# Patient Record
Sex: FEMALE | Race: WHITE | NOT HISPANIC OR LATINO | Employment: UNEMPLOYED | URBAN - METROPOLITAN AREA
[De-identification: names, ages, dates, MRNs, and addresses within clinical notes are randomized per-mention and may not be internally consistent; named-entity substitution may affect disease eponyms.]

---

## 2017-02-17 ENCOUNTER — HOSPITAL ENCOUNTER (OUTPATIENT)
Facility: MEDICAL CENTER | Age: 57
End: 2017-02-17
Attending: NURSE PRACTITIONER
Payer: COMMERCIAL

## 2017-02-17 ENCOUNTER — OFFICE VISIT (OUTPATIENT)
Dept: MEDICAL GROUP | Facility: MEDICAL CENTER | Age: 57
End: 2017-02-17
Payer: COMMERCIAL

## 2017-02-17 VITALS
HEIGHT: 67 IN | BODY MASS INDEX: 30.89 KG/M2 | TEMPERATURE: 97.6 F | DIASTOLIC BLOOD PRESSURE: 72 MMHG | WEIGHT: 196.8 LBS | HEART RATE: 66 BPM | SYSTOLIC BLOOD PRESSURE: 112 MMHG | RESPIRATION RATE: 12 BRPM | OXYGEN SATURATION: 96 %

## 2017-02-17 DIAGNOSIS — Z12.4 SCREENING FOR MALIGNANT NEOPLASM OF CERVIX: ICD-10-CM

## 2017-02-17 DIAGNOSIS — G47.09 OTHER INSOMNIA: ICD-10-CM

## 2017-02-17 DIAGNOSIS — Z01.419 ENCOUNTER FOR GYNECOLOGICAL EXAMINATION: ICD-10-CM

## 2017-02-17 DIAGNOSIS — F32.89 OTHER DEPRESSION: ICD-10-CM

## 2017-02-17 DIAGNOSIS — E78.5 DYSLIPIDEMIA: ICD-10-CM

## 2017-02-17 DIAGNOSIS — B00.9 HSV INFECTION: ICD-10-CM

## 2017-02-17 LAB — CYTOLOGY REG CYTOL: NORMAL

## 2017-02-17 PROCEDURE — 88175 CYTOPATH C/V AUTO FLUID REDO: CPT

## 2017-02-17 PROCEDURE — 99396 PREV VISIT EST AGE 40-64: CPT | Performed by: NURSE PRACTITIONER

## 2017-02-17 RX ORDER — ZOLPIDEM TARTRATE 5 MG/1
5 TABLET ORAL NIGHTLY PRN
Qty: 15 TAB | Refills: 1 | Status: SHIPPED
Start: 2017-02-17 | End: 2017-03-02 | Stop reason: SDUPTHER

## 2017-02-17 RX ORDER — ATORVASTATIN CALCIUM 20 MG/1
40 TABLET, FILM COATED ORAL
Qty: 90 TAB | Refills: 3 | Status: SHIPPED | OUTPATIENT
Start: 2017-02-17 | End: 2017-12-04 | Stop reason: SDUPTHER

## 2017-02-17 RX ORDER — ACYCLOVIR 400 MG/1
400 TABLET ORAL
Qty: 90 TAB | Refills: 3 | Status: SHIPPED | OUTPATIENT
Start: 2017-02-17 | End: 2018-01-15 | Stop reason: SDUPTHER

## 2017-02-17 RX ORDER — DULOXETIN HYDROCHLORIDE 30 MG/1
30 CAPSULE, DELAYED RELEASE ORAL
Qty: 90 CAP | Refills: 3 | Status: SHIPPED | OUTPATIENT
Start: 2017-02-17 | End: 2017-06-08 | Stop reason: SDUPTHER

## 2017-02-17 NOTE — MR AVS SNAPSHOT
"        Celia Shaffer   2017 9:20 AM   Office Visit   MRN: [id number]    Department:  [address]   Dept Phone:  [phone]    Description:  Female : 1960   Provider:  HOMAR Ferreira           Reason for Visit     Annual Exam physical & pap     Medication Refill 3 month refills to [facility]       Allergies as of 2017     Allergen Noted Reactions    Nkda [No Known Drug Allergy] 2010         You were diagnosed with     Other insomnia   [705695]       Dyslipidemia   [054150]       Other depression   [7498665]       HSV infection   [552138]       Encounter for gynecological examination   [5528477]       Screening for malignant neoplasm of cervix   [606149]         Vital Signs     Blood Pressure Pulse Temperature Respirations Height Weight    112/72 mmHg 66 36.4 °C (97.6 °F) 12 1.702 m (5' 7\") 89.268 kg (196 lb 12.8 oz)    Body Mass Index Oxygen Saturation Smoking Status             30.82 kg/m2 96% Former Smoker         Basic Information     Date Of Birth Sex Race Ethnicity Preferred Language    1960 Female White Non- English      Problem List              ICD-10-CM Priority Class Noted - Resolved    Breast cancer (CMS-HCC) C50.919 Low  Unknown - Present    Depression F32.9 Low  Unknown - Present    PUD (peptic ulcer disease) K27.9 Low  Unknown - Present    Chronic neck pain M54.2, G89.29 Low  Unknown - Present    Hypothyroidism E03.9 Low  Unknown - Present    Coronary artery disease I25.10 High  2010 - Present    Dyslipidemia E78.5 Medium  2011 - Present    Obstructive sleep apnea syndrome G47.33 Medium  2011 - Present    Macrocytic anemia D53.9 Low  2012 - Present    HSV infection B00.9 Low  2014 - Present    History of myocardial infarction I25.2 Medium  2015 - Present    Abnormal TSH R79.89 Medium  3/10/2016 - Present    Low back pain with sciatica M54.40   2016 - Present      Health Maintenance        Date Due Completion " Dates    IMM DTaP/Tdap/Td Vaccine (1 - Tdap) 7/5/1980 ---    PAP SMEAR 8/15/2016 8/15/2013, 8/9/2012    IMM INFLUENZA (1) 9/1/2016 9/16/2011    COLONOSCOPY 9/26/2022 9/26/2012 (Done), 9/26/2009 (Done)    Override on 9/26/2012: Done ([facility])    Override on 9/26/2009: Done ([facility])            Current Immunizations     Influenza TIV (IM) 9/16/2011      Below and/or attached are the medications your provider expects you to take. Review all of your home medications and newly ordered medications with your provider and/or pharmacist. Follow medication instructions as directed by your provider and/or pharmacist. Please keep your medication list with you and share with your provider. Update the information when medications are discontinued, doses are changed, or new medications (including over-the-counter products) are added; and carry medication information at all times in the event of emergency situations     Allergies:  NKDA - (reactions not documented)               Medications  Valid as of: February 17, 2017 -  9:49 AM    Generic Name Brand Name Tablet Size Instructions for use    Acyclovir (Ointment) ZOVIRAX 5 % APPLY TO AFFECTED AREA(S) THREE TIMES DAILY.        Acyclovir (Tab) ZOVIRAX 400 MG Take 1 Tab by mouth every day.        Atorvastatin Calcium (Tab) LIPITOR 20 MG Take 2 Tabs by mouth every bedtime.        Cholecalciferol   Take  by mouth.        DiazePAM (Tab) VALIUM 2 MG Take 1 Tab by mouth every 8 hours as needed for Anxiety.        DULoxetine HCl (Cap DR Particles) CYMBALTA 30 MG Take 1 Cap by mouth every day. TAKE 1 CAP BY MOUTH EVERY DAY.        Multiple Vitamin (Tab) THERAGRAN  Take 1 Tab by mouth every day.        TraMADol HCl (Tab) ULTRAM 50 MG Take 1-2 Tabs by mouth every 6 hours as needed for Moderate Pain.        Zolpidem Tartrate (Tab) AMBIEN 5 MG Take 1 Tab by mouth at bedtime as needed for Sleep.        .                 Medicines prescribed today were sent to:     [facility]/PHARMACY #[id number] -  [place] - [address]    [address] [place] [address]    Phone: [phone] Fax: [phone]    Open 24 Hours?: No    [facility] - [place], [place] - [address] [facility]    [address] [facility] [facility] [address]    Phone: [phone] Fax: [phone]    Open 24 Hours?: No      Medication refill instructions:       If your prescription bottle indicates you have medication refills left, it is not necessary to call your provider’s office. Please contact your pharmacy and they will refill your medication.    If your prescription bottle indicates you do not have any refills left, you may request refills at any time through one of the following ways: The online [employer] system (except Urgent Care), by calling your provider’s office, or by asking your pharmacy to contact your provider’s office with a refill request. Medication refills are processed only during regular business hours and may not be available until the next business day. Your provider may request additional information or to have a follow-up visit with you prior to refilling your medication.   *Please Note: Medication refills are assigned a new Rx number when refilled electronically. Your pharmacy may indicate that no refills were authorized even though a new prescription for the same medication is available at the pharmacy. Please request the medicine by name with the pharmacy before contacting your provider for a refill.        Your To Do List     Future Labs/Procedures Complete By Expires    THINPREP PAP,REFLEX HPV ON ASC-US ONLY  As directed 2/18/2018         [employer] Access Code: Activation code not generated  Current [employer] Status: Active

## 2017-02-17 NOTE — PROGRESS NOTES
Chief Complaint   Patient presents with   • Annual Exam     physical & pap    • Medication Refill     3 month refills to [facility]        HPI:  Celia is a 56 y.o.  female who presents for annual exam. She has a history of breast cancer when she was 32 years old and had a double mastectomy. She's not having any breast or chest pain or discomforts. She has a history of a possible MI about 8 years ago and has been followed closely by cardiology. She had all normal stress testing done one year ago and has not been having any chest pain problems since. She prefers to take lower quantities of medication when possible and reduced her atorvastatin to 20 mg daily.  She is having trouble falling and staying asleep. She is under a lot of stress at home and she's had a death in her family. She is requesting help with sleep. She's tried antihistamines which she reports make her very groggy.  Regarding her health maintenance:   Last pap: 2013  Abnormal Pap hx: none. She mentions that she had an inflation in her early 30s because of heavy bleeding and has not had any vaginal bleeding since.  Periods: menopause  Last Mammo:not applicable due to hx of breast cancer age 32  Last colonoscopy:2012  Bone density test:2015  Last Lab: UTD  Last Td:current   Influenza vaccination:current - through health fair  Pneumococcal vaccination:not applicable   Zostavax:not applicable   Hx STD''s: no   Regular exercise: yes      meds:   Current Outpatient Prescriptions   Medication Sig Dispense Refill   • acyclovir (ZOVIRAX) 5 % Ointment APPLY TO AFFECTED AREA(S) THREE TIMES DAILY. 30 g 11   • tramadol (ULTRAM) 50 MG Tab Take 1-2 Tabs by mouth every 6 hours as needed for Moderate Pain. 30 Tab 1   • duloxetine (CYMBALTA) 30 MG Cap DR Particles TAKE 1 CAP BY MOUTH EVERY DAY. 30 Cap 11   • diazepam (VALIUM) 2 MG Tab Take 1 Tab by mouth every 8 hours as needed for Anxiety. 20 Tab 1   • atorvastatin (LIPITOR) 40 MG Tab Take 1 Tab by mouth every  "bedtime. 90 Tab 3   • Cholecalciferol (VITAMIN D PO) Take  by mouth.     • acyclovir (ZOVIRAX) 400 MG tablet TAKE 1 TABLET BY MOUTH EVERY DAY 60 Tab 0   • multivitamin (THERAGRAN) TABS Take 1 Tab by mouth every day.       No current facility-administered medications for this visit.       Allergies: No Known Allergies    family:   Family History   Problem Relation Age of Onset   • Hypertension Mother    • Arthritis Mother    • Stroke Mother    • Lung Disease Father      pulmonary fibrosis       social hx:   Social History     Social History   • Marital Status:      Spouse Name: N/A   • Number of Children: N/A   • Years of Education: N/A     Occupational History   • Not on file.     Social History Main Topics   • Smoking status: Former Smoker -- 0.50 packs/day for 10 years     Types: Cigarettes     Quit date: 01/01/1991   • Smokeless tobacco: Never Used   • Alcohol Use: Yes      Comment: 2 per day   • Drug Use: No   • Sexual Activity: Not on file      Comment: , 1 child IGT     Other Topics Concern   • Not on file     Social History Narrative       ROS:  No fever, chills, sweats.   No polydipsia, polyuria, temperature intolerance, significant weight changes   No visual changes, blurred vision.  No chest pain, palpitations, peripheral swelling   No chronic cough, shortness of breath, dyspnea with exertion.   No dysphagia, odynophagia, black or bloody stools.   No abdominal pain, nausea, persistent diarrhea, constipation   No dysuria, hematuria, incontinence. Denies nocturia.  Had botox in bladder last year with Dr. Daugherty - helped a lot  No rash, pruritis, pigment changes.   No focal weakness, syncope, headache, confusion, persistent numbness.   All other systems are reviewed and negative.    PHYSICAL EXAMINATION:  Blood pressure 112/72, pulse 66, temperature 36.4 °C (97.6 °F), resp. rate 12, height 1.702 m (5' 7\"), weight 89.268 kg (196 lb 12.8 oz), SpO2 96 %.  General appearance:healthy, well developed, " well nourished  Psych: alert, no distress, cooperative  Eyes: EOM's normal, pupils equal, round, reactive to light  ENT: Ears: external ears normal to inspection and palpation, TM's clear, Nose/Sinuses: nose shows no deformity, asymmetry, or inflammation  Neck: no asymmetry, masses, or scars, no adenopathy, thyroid normal to palpation  Lungs:chest symmetric with normal A/P diameter, no chest deformities noted, normal respiratory rate and rhythm  Cardiovascular:regular rate and rhythm, S1 normal  Breasts: Surgically absent with breast implants in place. No abnormal lumps, bumps or tenderness to her chest and axillary exam.  Abdomen: umbilicus normal, no masses palpable, no organomegaly  Musculoskeletal:ROM of all joints is normal, no evidence of joint instability  Lymphatic: None significantly enlarged  Skin: no rash, no edema  Neuro: mental status intact, cranial nerves 2-12 intact  Pelvic: external genitalia normal, cervix normal in appearance, bimanual exam reveals normal uterus, adnexa without masses or tenderness, vaginal mucosa normal      ASSESSMENT/PLAN:  1.annual physical exam: HCM:  Pap and breast exams done.  BSE technique reviewed and patient encouraged to perform self-exam monthly.   Encourage monthly self breast exam  Encourage daily exercise for at least 30 minutes  Recommend 1500 mg Calcium with 600 units vit d daily.    Follow up one year for annual PAP  Recommend labs yearly  Colonoscopy due in 2022  2.  Insomnia: Trial of zolpidem 2.5-5 mg 15-20 minutes prior to wanting to fall asleep. Discussed allowing at least 6 hours for sleep when taking Ambien. Discussed potential side effects of Ambien. She'll email me if this is not helpful for her. Discussed sleep hygiene.

## 2017-03-02 DIAGNOSIS — G47.09 OTHER INSOMNIA: ICD-10-CM

## 2017-03-02 RX ORDER — ZOLPIDEM TARTRATE 5 MG/1
5 TABLET ORAL NIGHTLY PRN
Qty: 30 TAB | Refills: 5 | Status: SHIPPED
Start: 2017-03-02 | End: 2017-03-03 | Stop reason: SDUPTHER

## 2017-03-03 DIAGNOSIS — G47.09 OTHER INSOMNIA: ICD-10-CM

## 2017-03-03 RX ORDER — ZOLPIDEM TARTRATE 5 MG/1
5 TABLET ORAL NIGHTLY PRN
Qty: 30 TAB | Refills: 5 | Status: SHIPPED | OUTPATIENT
Start: 2017-03-03 | End: 2017-06-14 | Stop reason: SDUPTHER

## 2017-03-03 NOTE — TELEPHONE ENCOUNTER
[facility] approved this at home, but needs to be faxed to mail order.  They will not let us call in to mail order.  Will you approve and then I will fax?  Thanks

## 2017-05-13 ENCOUNTER — OFFICE VISIT (OUTPATIENT)
Dept: URGENT CARE | Facility: CLINIC | Age: 57
End: 2017-05-13
Payer: COMMERCIAL

## 2017-05-13 VITALS
BODY MASS INDEX: 30.06 KG/M2 | DIASTOLIC BLOOD PRESSURE: 60 MMHG | SYSTOLIC BLOOD PRESSURE: 110 MMHG | RESPIRATION RATE: 16 BRPM | HEART RATE: 74 BPM | TEMPERATURE: 97.9 F | OXYGEN SATURATION: 96 % | WEIGHT: 192 LBS

## 2017-05-13 PROCEDURE — 99213 OFFICE O/P EST LOW 20 MIN: CPT | Performed by: PHYSICIAN ASSISTANT

## 2017-05-13 ASSESSMENT — ENCOUNTER SYMPTOMS
FEVER: 0
CONSTITUTIONAL NEGATIVE: 1
SWOLLEN GLANDS: 0
MUSCULOSKELETAL NEGATIVE: 1
NEUROLOGICAL NEGATIVE: 1
JOINT SWELLING: 0

## 2017-05-13 NOTE — MR AVS SNAPSHOT
Celia Shaffer   2017 1:45 PM   Office Visit   MRN: [id number]    Department:  [facility]   Dept Phone:  [phone]    Description:  Female : 1960   Provider:  Amilcar Schultz PA-C           Reason for Visit     Foreign Body in Skin x 2 wks, Splinter in Lt. heel, hard to walk      Allergies as of 2017     Allergen Noted Reactions    Nkda [No Known Drug Allergy] 2010         Vital Signs     Blood Pressure Pulse Temperature Respirations Weight Oxygen Saturation    110/60 mmHg 74 36.6 °C (97.9 °F) 16 87.091 kg (192 lb) 96%    Smoking Status                   Former Smoker           Basic Information     Date Of Birth Sex Race Ethnicity Preferred Language    1960 Female White Non- English      Problem List              ICD-10-CM Priority Class Noted - Resolved    Breast cancer (CMS-HCC) C50.919 Low  Unknown - Present    Depression F32.9 Low  Unknown - Present    PUD (peptic ulcer disease) K27.9 Low  Unknown - Present    Chronic neck pain M54.2, G89.29 Low  Unknown - Present    Hypothyroidism E03.9 Low  Unknown - Present    Coronary artery disease I25.10 High  2010 - Present    Dyslipidemia E78.5 Medium  2011 - Present    Obstructive sleep apnea syndrome G47.33 Medium  2011 - Present    Macrocytic anemia D53.9 Low  2012 - Present    HSV infection B00.9 Low  2014 - Present    History of myocardial infarction I25.2 Medium  2015 - Present    Abnormal TSH R79.89 Medium  3/10/2016 - Present    Low back pain with sciatica M54.40   2016 - Present      Health Maintenance        Date Due Completion Dates    IMM DTaP/Tdap/Td Vaccine (1 - Tdap) 1980 ---    PAP SMEAR 2020, 8/15/2013, 2012    COLONOSCOPY 2022 (Done), 2009 (Done)    Override on 2012: Done ([facility])    Override on 2009: Done ([facility])            Current Immunizations     Influenza TIV (IM) 2011      Below and/or  attached are the medications your provider expects you to take. Review all of your home medications and newly ordered medications with your provider and/or pharmacist. Follow medication instructions as directed by your provider and/or pharmacist. Please keep your medication list with you and share with your provider. Update the information when medications are discontinued, doses are changed, or new medications (including over-the-counter products) are added; and carry medication information at all times in the event of emergency situations     Allergies:  NKDA - (reactions not documented)               Medications  Valid as of: May 13, 2017 -  2:40 PM    Generic Name Brand Name Tablet Size Instructions for use    Acyclovir (Ointment) ZOVIRAX 5 % APPLY TO AFFECTED AREA(S) THREE TIMES DAILY.        Acyclovir (Tab) ZOVIRAX 400 MG Take 1 Tab by mouth every day.        Atorvastatin Calcium (Tab) LIPITOR 20 MG Take 2 Tabs by mouth every bedtime.        Cholecalciferol   Take  by mouth.        DiazePAM (Tab) VALIUM 2 MG Take 1 Tab by mouth every 8 hours as needed for Anxiety.        DULoxetine HCl (Cap DR Particles) CYMBALTA 30 MG Take 1 Cap by mouth every day. TAKE 1 CAP BY MOUTH EVERY DAY.        Multiple Vitamin (Tab) THERAGRAN  Take 1 Tab by mouth every day.        TraMADol HCl (Tab) ULTRAM 50 MG Take 1-2 Tabs by mouth every 6 hours as needed for Moderate Pain.        Zolpidem Tartrate (Tab) AMBIEN 5 MG Take 1 Tab by mouth at bedtime as needed for Sleep.        .                 Medicines prescribed today were sent to:     [facility]Y #[id number] - [place] - [address]    [address] [place] [address]    Phone: [phone] Fax: [phone]    Open 24 Hours?: No    [facility] - [place], [place] - [address]    [address] [facility] [address] [facility] [address]    Phone: [phone] Fax: [phone]    Open 24 Hours?: No      Medication refill instructions:       If your prescription bottle  indicates you have medication refills left, it is not necessary to call your provider’s office. Please contact your pharmacy and they will refill your medication.    If your prescription bottle indicates you do not have any refills left, you may request refills at any time through one of the following ways: The online [employer] system (except Urgent Care), by calling your provider’s office, or by asking your pharmacy to contact your provider’s office with a refill request. Medication refills are processed only during regular business hours and may not be available until the next business day. Your provider may request additional information or to have a follow-up visit with you prior to refilling your medication.   *Please Note: Medication refills are assigned a new Rx number when refilled electronically. Your pharmacy may indicate that no refills were authorized even though a new prescription for the same medication is available at the pharmacy. Please request the medicine by name with the pharmacy before contacting your provider for a refill.           [employer] Access Code: Activation code not generated  Current [employer] Status: Active

## 2017-05-13 NOTE — PROGRESS NOTES
Subjective:      Celia Shaffer is a 56 y.o. female who presents with Foreign Body in Skin            Foreign Body in Skin  This is a new problem. The current episode started 1 to 4 weeks ago (heel). The problem occurs constantly. The problem has been unchanged. Pertinent negatives include no fever, joint swelling or swollen glands. The symptoms are aggravated by walking. She has tried nothing for the symptoms. The treatment provided no relief.       Review of Systems   Constitutional: Negative.  Negative for fever.   Musculoskeletal: Negative.  Negative for joint swelling.   Skin: Negative.    Neurological: Negative.           Objective:     /60 mmHg  Pulse 74  Temp(Src) 36.6 °C (97.9 °F)  Resp 16  Wt 87.091 kg (192 lb)  SpO2 96%     Physical Exam   Constitutional: She is oriented to person, place, and time. She appears well-developed and well-nourished. No distress.   Musculoskeletal: Normal range of motion. She exhibits tenderness. She exhibits no edema.   sm heel fissure/lac; cleaned hibiclens, dermabond closure, steri strips   Neurological: She is alert and oriented to person, place, and time. No sensory deficit. She exhibits normal muscle tone. Gait abnormal. Coordination normal.   Skin: Skin is warm and dry. There is erythema.   Psychiatric: She has a normal mood and affect. Her behavior is normal. Judgment and thought content normal.   Nursing note and vitals reviewed.    Filed Vitals:    05/13/17 1406   BP: 110/60   Pulse: 74   Temp: 36.6 °C (97.9 °F)   Resp: 16   Weight: 87.091 kg (192 lb)   SpO2: 96%     Active Ambulatory Problems     Diagnosis Date Noted   • Breast cancer (CMS-HCC)    • Depression    • PUD (peptic ulcer disease)    • Chronic neck pain    • Hypothyroidism    • Coronary artery disease 08/02/2010   • Dyslipidemia 07/13/2011   • Obstructive sleep apnea syndrome 07/13/2011   • Macrocytic anemia 08/22/2012   • HSV infection 07/14/2014   • History of myocardial infarction  02/09/2015   • Abnormal TSH 03/10/2016   • Low back pain with sciatica 09/27/2016     Resolved Ambulatory Problems     Diagnosis Date Noted   • CHEST PAIN 11/09/2012   • Chest tightness 03/09/2016   • Jaw pain 03/09/2016   • Dyspnea on exertion 03/09/2016     Past Medical History   Diagnosis Date   • ASTHMA    • Cancer (CMS-HCC) 1992   • Sleep apnea    • Breath shortness    • Cold    • Anesthesia    • Pneumonia 2008   • CAD (coronary artery disease)    • Myocardial infarct (CMS-HCC)      Current Outpatient Prescriptions on File Prior to Visit   Medication Sig Dispense Refill   • zolpidem (AMBIEN) 5 MG Tab Take 1 Tab by mouth at bedtime as needed for Sleep. 30 Tab 5   • atorvastatin (LIPITOR) 20 MG Tab Take 2 Tabs by mouth every bedtime. 90 Tab 3   • duloxetine (CYMBALTA) 30 MG Cap DR Particles Take 1 Cap by mouth every day. TAKE 1 CAP BY MOUTH EVERY DAY. 90 Cap 3   • acyclovir (ZOVIRAX) 400 MG tablet Take 1 Tab by mouth every day. 90 Tab 3   • acyclovir (ZOVIRAX) 5 % Ointment APPLY TO AFFECTED AREA(S) THREE TIMES DAILY. 30 g 11   • tramadol (ULTRAM) 50 MG Tab Take 1-2 Tabs by mouth every 6 hours as needed for Moderate Pain. 30 Tab 1   • diazepam (VALIUM) 2 MG Tab Take 1 Tab by mouth every 8 hours as needed for Anxiety. 20 Tab 1   • Cholecalciferol (VITAMIN D PO) Take  by mouth.     • multivitamin (THERAGRAN) TABS Take 1 Tab by mouth every day.       No current facility-administered medications on file prior to visit.     Gargles, Cepacol lozenges, Aleve/Advil as needed for throat pain  Family History   Problem Relation Age of Onset   • Hypertension Mother    • Arthritis Mother    • Stroke Mother    • Lung Disease Father      pulmonary fibrosis     Nkda              Assessment/Plan:     ·  fissure/crack/lac in heel      · If glue/strips dont hold, f/u here [facility].to suture

## 2017-06-08 DIAGNOSIS — F32.89 OTHER DEPRESSION: ICD-10-CM

## 2017-06-08 RX ORDER — DULOXETIN HYDROCHLORIDE 30 MG/1
30 CAPSULE, DELAYED RELEASE ORAL
Qty: 30 CAP | Refills: 0 | Status: SHIPPED | OUTPATIENT
Start: 2017-06-08 | End: 2017-12-04

## 2017-06-08 NOTE — TELEPHONE ENCOUNTER
Was the patient seen in the last year in this department? Yes     Does patient have an active prescription for medications requested? No         Prescription Question        From     Celia Shaffer      To     Daniel Block Ma      Sent     6/8/2017 11:22 AM            Hi there,     Can you please have [facility] send an emergency override for two weeks worth of the generic Cymbalta to [facility]?   I have been in insurance [facility] since starting a new job.  I was covered under my 's MARY[employer] & [facility] through the [employer].  I now work for [employer].  I still have  and [facility] (same member number, etc.). But, [facility] had trouble sorting out the transfer of insurance.  I'm on 3 month mailorder for meds, but I won't get it in time.     Thanks!              Encounter Messages        Read Composed From To Subject       Y 6/8/2017 11:22 AM [facility] Guzman Prescription Question

## 2017-06-14 RX ORDER — ZOLPIDEM TARTRATE 5 MG/1
5 TABLET ORAL NIGHTLY PRN
Qty: 90 TAB | Refills: 0 | Status: SHIPPED
Start: 2017-06-14 | End: 2018-03-05 | Stop reason: SDUPTHER

## 2017-06-14 NOTE — TELEPHONE ENCOUNTER
Hi there,   Thank you for your help on my prescriptions, I recd'd them and am all set except for one prescription. My new insurance requires the Ambien to be a 90 day supply, they won't allow a 30 day.  Can you please ask Shantel to submit a 90 day supply through [employer]?     Last  refill 3/8/17

## 2017-08-16 DIAGNOSIS — F32.A DEPRESSION, UNSPECIFIED DEPRESSION TYPE: ICD-10-CM

## 2017-08-16 RX ORDER — DULOXETIN HYDROCHLORIDE 20 MG/1
20 CAPSULE, DELAYED RELEASE ORAL
Qty: 90 CAP | Refills: 3 | Status: SHIPPED | OUTPATIENT
Start: 2017-08-16 | End: 2018-03-06 | Stop reason: SDUPTHER

## 2017-08-16 RX ORDER — OMEPRAZOLE 40 MG/1
40 CAPSULE, DELAYED RELEASE ORAL DAILY
Qty: 90 CAP | Refills: 2 | Status: SHIPPED | OUTPATIENT
Start: 2017-08-16 | End: 2019-10-29

## 2017-08-16 RX ORDER — OMEPRAZOLE 40 MG/1
40 CAPSULE, DELAYED RELEASE ORAL DAILY
COMMUNITY
End: 2017-08-16 | Stop reason: SDUPTHER

## 2017-08-16 NOTE — TELEPHONE ENCOUNTER
----- Message from Your Healthcare Team sent at 8/16/2017  9:04 AM PDT -----  Regarding: Prescription Question  Contact: [phone]  Sree Funes!    I have a few medication things to run by you before I do my 90 day refills.  I would like to take the Cymbalta back down to 20mg. (I'm doing well &#[id number];)  Since my heart looks great, I would like to get off the statin entirely.  I would like a refill on the Omeprazole 40mg. (I don't believe that transferred over to my [facility] 90 day protocol.)    If I need to book an appt - please let me know.    Warm regards,  Celia

## 2017-10-24 ENCOUNTER — APPOINTMENT (OUTPATIENT)
Dept: SOCIAL WORK | Facility: CLINIC | Age: 57
End: 2017-10-24

## 2017-10-24 PROCEDURE — 90686 IIV4 VACC NO PRSV 0.5 ML IM: CPT | Performed by: REGISTERED NURSE

## 2017-11-14 ENCOUNTER — HOSPITAL ENCOUNTER (OUTPATIENT)
Facility: MEDICAL CENTER | Age: 57
End: 2017-11-14
Payer: COMMERCIAL

## 2017-11-14 LAB
25(OH)D3 SERPL-MCNC: 26 NG/ML (ref 30–100)
ALBUMIN SERPL BCP-MCNC: 4.3 G/DL (ref 3.2–4.9)
ALBUMIN/GLOB SERPL: 1.4 G/DL
ALP SERPL-CCNC: 77 U/L (ref 30–99)
ALT SERPL-CCNC: 35 U/L (ref 2–50)
ANION GAP SERPL CALC-SCNC: 8 MMOL/L (ref 0–11.9)
AST SERPL-CCNC: 27 U/L (ref 12–45)
BILIRUB SERPL-MCNC: 0.4 MG/DL (ref 0.1–1.5)
BUN SERPL-MCNC: 14 MG/DL (ref 8–22)
CALCIUM SERPL-MCNC: 9.4 MG/DL (ref 8.5–10.5)
CHLORIDE SERPL-SCNC: 107 MMOL/L (ref 96–112)
CHOLEST SERPL-MCNC: 211 MG/DL (ref 100–199)
CO2 SERPL-SCNC: 25 MMOL/L (ref 20–33)
CREAT SERPL-MCNC: 0.84 MG/DL (ref 0.5–1.4)
ERYTHROCYTE [DISTWIDTH] IN BLOOD BY AUTOMATED COUNT: 46.6 FL (ref 35.9–50)
GFR SERPL CREATININE-BSD FRML MDRD: >60 ML/MIN/1.73 M 2
GLOBULIN SER CALC-MCNC: 3 G/DL (ref 1.9–3.5)
GLUCOSE SERPL-MCNC: 88 MG/DL (ref 65–99)
HCT VFR BLD AUTO: 34.1 % (ref 37–47)
HDLC SERPL-MCNC: 56 MG/DL
HGB BLD-MCNC: 11.5 G/DL (ref 12–16)
LDLC SERPL CALC-MCNC: 137 MG/DL
MCH RBC QN AUTO: 33.8 PG (ref 27–33)
MCHC RBC AUTO-ENTMCNC: 33.7 G/DL (ref 33.6–35)
MCV RBC AUTO: 100.3 FL (ref 81.4–97.8)
PLATELET # BLD AUTO: 246 K/UL (ref 164–446)
PMV BLD AUTO: 9.8 FL (ref 9–12.9)
POTASSIUM SERPL-SCNC: 4.5 MMOL/L (ref 3.6–5.5)
PROT SERPL-MCNC: 7.3 G/DL (ref 6–8.2)
RBC # BLD AUTO: 3.4 M/UL (ref 4.2–5.4)
SODIUM SERPL-SCNC: 140 MMOL/L (ref 135–145)
T4 FREE SERPL-MCNC: 0.86 NG/DL (ref 0.53–1.43)
TRIGL SERPL-MCNC: 90 MG/DL (ref 0–149)
WBC # BLD AUTO: 3.7 K/UL (ref 4.8–10.8)

## 2017-12-04 ENCOUNTER — OFFICE VISIT (OUTPATIENT)
Dept: MEDICAL GROUP | Facility: LAB | Age: 57
End: 2017-12-04
Payer: COMMERCIAL

## 2017-12-04 VITALS
BODY MASS INDEX: 28.25 KG/M2 | WEIGHT: 180 LBS | TEMPERATURE: 98.7 F | DIASTOLIC BLOOD PRESSURE: 60 MMHG | SYSTOLIC BLOOD PRESSURE: 92 MMHG | HEART RATE: 71 BPM | RESPIRATION RATE: 12 BRPM | HEIGHT: 67 IN | OXYGEN SATURATION: 97 %

## 2017-12-04 DIAGNOSIS — F33.42 RECURRENT MAJOR DEPRESSIVE DISORDER, IN FULL REMISSION (HCC): ICD-10-CM

## 2017-12-04 DIAGNOSIS — Z23 NEED FOR TDAP VACCINATION: ICD-10-CM

## 2017-12-04 DIAGNOSIS — Z13.820 SCREENING FOR OSTEOPOROSIS: ICD-10-CM

## 2017-12-04 DIAGNOSIS — E78.5 DYSLIPIDEMIA: ICD-10-CM

## 2017-12-04 DIAGNOSIS — K27.9 PUD (PEPTIC ULCER DISEASE): ICD-10-CM

## 2017-12-04 DIAGNOSIS — Z78.0 MENOPAUSE: ICD-10-CM

## 2017-12-04 PROCEDURE — 90715 TDAP VACCINE 7 YRS/> IM: CPT | Performed by: NURSE PRACTITIONER

## 2017-12-04 PROCEDURE — 99214 OFFICE O/P EST MOD 30 MIN: CPT | Mod: 25 | Performed by: NURSE PRACTITIONER

## 2017-12-04 PROCEDURE — 90471 IMMUNIZATION ADMIN: CPT | Performed by: NURSE PRACTITIONER

## 2017-12-04 RX ORDER — ATORVASTATIN CALCIUM 10 MG/1
10 TABLET, FILM COATED ORAL
Qty: 90 TAB | Refills: 2
Start: 2017-12-04 | End: 2019-05-21

## 2017-12-04 ASSESSMENT — PATIENT HEALTH QUESTIONNAIRE - PHQ9: CLINICAL INTERPRETATION OF PHQ2 SCORE: 0

## 2017-12-04 NOTE — PROGRESS NOTES
Chief Complaint   Patient presents with   • Hyperlipidemia     pt has labs done through work concerned about cholesterol & osteopenia        HPI   Celia is a 57-year-old established female here to follow-up on chronic issues:  #1- dyslipidemia:  Chronic issue for the patient. Have labs drawn through work that show LDL cholesterol 137 with a normal HDL cholesterol and only borderline elevated triglycerides. Stopped statin 4 mo ago thinking that she did not need it because of a 20 pound weight loss that was intentional - restarted 2 d ago b/c of lab results. Denies negative side effects of statins   #2- slight anemia:  H& H 11.5- 34 with RBC 3.4 - donating blood every 90 days.  Denies symptoms related to anemia such as shortness of breath, chest pain or heart palpitations.  #3- depression / sleep disturbance:  Chronic issue. Continues on 20 mg of Cymbalta daily and periodic use of Ambien, not nightly. Her moods have been stable. She deals with a very high stress situation involving her daughter who has borderline personality disorder but the patient feels that she is coping well. Denies suicidal or homicidal ideations.  #4-peptic ulcer disease: Intermittent issue. Keeps omeprazole on hand and takes it for 2-3 weeks when she starts to have abdominal pain. Denies any current issues with this - denies black or bloody stools.    Lab work:  Component      Latest Ref Rng & Units 10/8/2013 9/18/2014 3/9/2016 10/18/2016          10:56 AM  9:24 AM  9:51 AM  9:02 AM   Cholesterol,Tot      100 - 199 mg/dL 181 131 161 182   Triglycerides      0 - 149 mg/dL 97 75 64 114   HDL      >=40 mg/dL 66 59 76 66   VLDL Cholesterol Calc      5 - 40 mg/dL  15     LDL      <100 mg/dL 96 57 72 93     Component      Latest Ref Rng & Units 11/14/2017           7:50 AM   Cholesterol,Tot      100 - 199 mg/dL 211 (H)   Triglycerides      0 - 149 mg/dL 90   HDL      >=40 mg/dL 56   VLDL Cholesterol Calc      5 - 40 mg/dL    LDL      <100 mg/dL  "137 (H)     Past medical, surgical, family, and social history is reviewed and updated in Epic chart by me today.   Medications and allergies reviewed and updated in Epic chart by me today.     ROS:   As documented in history of present illness above    Exam:  Blood pressure (!) 92/60, pulse 71, temperature 37.1 °C (98.7 °F), resp. rate 12, height 1.702 m (5' 7\"), weight 81.6 kg (180 lb), SpO2 97 %.  Gen. appears healthy in no distress   Skin appropriate for sex and age   HEENT unremarkable   Neck no adenopathy, no nodules or masses palpable   Lungs clear   Heart regular   Extremities no edema   Neuro gait and station normal   Psych appropriate      A/P:  1. PUD (peptic ulcer disease)  -Stable with periodic use of omeprazole. Encouraged her to refrain from NSAIDs. She has completely quit drinking alcohol about a year ago.    2. Recurrent major depressive disorder, in full remission (CMS-HCC)  -Stable with current dosage of Cymbalta. Sleep is doing well with periodic use of Ambien.    3. Dyslipidemia  -She is planning on losing another 15-20 pounds through healthy eating, refrain from alcohol and exercising. She is going to restart atorvastatin at 10 mg daily and recheck her lipids in 3 months. Discussed that she may take Lipitor as infrequently as 2-3 times per week to assess the effect on her lipid panel. She will follow up here in 3 months.    I have placed the below orders and discussed them with Dr. Irizarry. the MA is performing the below orders under the direction of Dr. ANGELES.  Her TdaP was updated today.  "

## 2018-01-15 DIAGNOSIS — B00.9 HSV INFECTION: ICD-10-CM

## 2018-01-15 RX ORDER — ACYCLOVIR 400 MG/1
400 TABLET ORAL
Qty: 90 TAB | Refills: 3 | Status: SHIPPED | OUTPATIENT
Start: 2018-01-15 | End: 2019-03-26 | Stop reason: SDUPTHER

## 2018-03-06 DIAGNOSIS — F32.A DEPRESSION, UNSPECIFIED DEPRESSION TYPE: ICD-10-CM

## 2018-03-06 RX ORDER — DULOXETIN HYDROCHLORIDE 20 MG/1
20 CAPSULE, DELAYED RELEASE ORAL
Qty: 90 CAP | Refills: 3 | Status: SHIPPED | OUTPATIENT
Start: 2018-03-06 | End: 2018-12-10 | Stop reason: SDUPTHER

## 2018-04-11 DIAGNOSIS — E78.5 DYSLIPIDEMIA: ICD-10-CM

## 2018-04-11 RX ORDER — ATORVASTATIN CALCIUM 20 MG/1
20 TABLET, FILM COATED ORAL
Qty: 90 TAB | Refills: 3 | Status: SHIPPED | OUTPATIENT
Start: 2018-04-11 | End: 2019-05-21

## 2018-04-11 NOTE — TELEPHONE ENCOUNTER
Was the patient seen in the last year in this department? Yes patient needs script to go to [facility]    Does patient have an active prescription for medications requested? No     Received Request Via: Patient

## 2018-12-10 DIAGNOSIS — F32.A DEPRESSION, UNSPECIFIED DEPRESSION TYPE: ICD-10-CM

## 2018-12-10 DIAGNOSIS — F51.01 PRIMARY INSOMNIA: ICD-10-CM

## 2018-12-10 RX ORDER — ZOLPIDEM TARTRATE 5 MG/1
TABLET ORAL
Qty: 90 TAB | Refills: 2 | Status: SHIPPED
Start: 2018-12-10 | End: 2018-12-13 | Stop reason: SDUPTHER

## 2018-12-10 RX ORDER — DULOXETIN HYDROCHLORIDE 20 MG/1
CAPSULE, DELAYED RELEASE ORAL
Qty: 90 CAP | Refills: 2 | Status: SHIPPED | OUTPATIENT
Start: 2018-12-10 | End: 2018-12-13 | Stop reason: SDUPTHER

## 2018-12-10 NOTE — TELEPHONE ENCOUNTER
Was the patient seen in the last year in this department? Yes [facility] 3/7/18 #90    Does patient have an active prescription for medications requested? No     Received Request Via: Pharmacy

## 2018-12-13 DIAGNOSIS — F51.01 PRIMARY INSOMNIA: ICD-10-CM

## 2018-12-13 DIAGNOSIS — F32.A DEPRESSION, UNSPECIFIED DEPRESSION TYPE: ICD-10-CM

## 2018-12-13 RX ORDER — ZOLPIDEM TARTRATE 5 MG/1
5 TABLET ORAL NIGHTLY PRN
Qty: 90 TAB | Refills: 2 | Status: SHIPPED
Start: 2018-12-13 | End: 2019-10-02 | Stop reason: SDUPTHER

## 2018-12-13 RX ORDER — DULOXETIN HYDROCHLORIDE 20 MG/1
20 CAPSULE, DELAYED RELEASE ORAL
Qty: 90 CAP | Refills: 2 | Status: SHIPPED | OUTPATIENT
Start: 2018-12-13 | End: 2019-10-29

## 2018-12-13 NOTE — TELEPHONE ENCOUNTER
Was the patient seen in the last year in this department? Yes [facility] 3/7/18 #90    Does patient have an active prescription for medications requested? No     Received Request Via: Pharmacy

## 2018-12-13 NOTE — TELEPHONE ENCOUNTER
----- Message from Celia Shaffer sent at 12/12/2018  6:51 PM PST -----  Regarding: Prescription Question  Contact: [phone]  Hi there,    I had just requested refills on a 90 day supply of Duloxetine( Cymbalta) 20mg and a 90 day supply of 5mg  [facility]  I called them, and with insurance my co-pay on the Duloxetine was over $200.   I can get the same prescription (without insurance) for about $25 at [facility] on [address]. [phone]    Would you please send the prescription to [facility] for me?    My sincere apologies for the extra work and my sincere thanks for your help!  Celia

## 2019-03-26 DIAGNOSIS — B00.9 HSV INFECTION: ICD-10-CM

## 2019-03-26 RX ORDER — ACYCLOVIR 400 MG/1
400 TABLET ORAL
Qty: 30 TAB | Refills: 0 | Status: SHIPPED | OUTPATIENT
Start: 2019-03-26 | End: 2019-04-08 | Stop reason: SDUPTHER

## 2019-03-26 NOTE — TELEPHONE ENCOUNTER
Was the patient seen in the last year in this department? No 2/4/17    Does patient have an active prescription for medications requested? No     Received Request Via: Patient

## 2019-04-08 ENCOUNTER — OFFICE VISIT (OUTPATIENT)
Dept: MEDICAL GROUP | Facility: LAB | Age: 59
End: 2019-04-08
Payer: COMMERCIAL

## 2019-04-08 VITALS
TEMPERATURE: 98 F | DIASTOLIC BLOOD PRESSURE: 50 MMHG | HEART RATE: 86 BPM | RESPIRATION RATE: 12 BRPM | OXYGEN SATURATION: 95 % | WEIGHT: 158 LBS | HEIGHT: 67 IN | BODY MASS INDEX: 24.8 KG/M2 | SYSTOLIC BLOOD PRESSURE: 90 MMHG

## 2019-04-08 DIAGNOSIS — B00.9 HSV INFECTION: ICD-10-CM

## 2019-04-08 DIAGNOSIS — Z00.00 WELL ADULT EXAM: ICD-10-CM

## 2019-04-08 DIAGNOSIS — F32.A DEPRESSION, UNSPECIFIED DEPRESSION TYPE: ICD-10-CM

## 2019-04-08 DIAGNOSIS — Z91.89 OTHER SPECIFIED PERSONAL RISK FACTORS, NOT ELSEWHERE CLASSIFIED: ICD-10-CM

## 2019-04-08 DIAGNOSIS — E78.5 DYSLIPIDEMIA: ICD-10-CM

## 2019-04-08 PROCEDURE — 99396 PREV VISIT EST AGE 40-64: CPT | Performed by: NURSE PRACTITIONER

## 2019-04-08 RX ORDER — ACYCLOVIR 400 MG/1
400 TABLET ORAL
Qty: 90 TAB | Refills: 3 | Status: SHIPPED | OUTPATIENT
Start: 2019-04-08 | End: 2019-04-30 | Stop reason: SDUPTHER

## 2019-04-08 RX ORDER — DESVENLAFAXINE SUCCINATE 50 MG/1
50 TABLET, EXTENDED RELEASE ORAL DAILY
Qty: 30 TAB | Refills: 5 | Status: SHIPPED | OUTPATIENT
Start: 2019-04-08 | End: 2019-04-30 | Stop reason: SDUPTHER

## 2019-04-08 RX ORDER — ATORVASTATIN CALCIUM 10 MG/1
10 TABLET, FILM COATED ORAL
Qty: 90 TAB | Refills: 3 | Status: CANCELLED | OUTPATIENT
Start: 2019-04-08

## 2019-04-08 RX ORDER — DULOXETIN HYDROCHLORIDE 20 MG/1
20 CAPSULE, DELAYED RELEASE ORAL
Qty: 90 CAP | Refills: 3 | Status: CANCELLED | OUTPATIENT
Start: 2019-04-08

## 2019-04-08 ASSESSMENT — PATIENT HEALTH QUESTIONNAIRE - PHQ9
8. MOVING OR SPEAKING SO SLOWLY THAT OTHER PEOPLE COULD HAVE NOTICED. OR THE OPPOSITE, BEING SO FIGETY OR RESTLESS THAT YOU HAVE BEEN MOVING AROUND A LOT MORE THAN USUAL: NOT AT ALL
7. TROUBLE CONCENTRATING ON THINGS, SUCH AS READING THE NEWSPAPER OR WATCHING TELEVISION: MORE THAN HALF THE DAYS
5. POOR APPETITE OR OVEREATING: NOT AT ALL
1. LITTLE INTEREST OR PLEASURE IN DOING THINGS: SEVERAL DAYS
6. FEELING BAD ABOUT YOURSELF - OR THAT YOU ARE A FAILURE OR HAVE LET YOURSELF OR YOUR FAMILY DOWN: NOT AL ALL
4. FEELING TIRED OR HAVING LITTLE ENERGY: MORE THAN HALF THE DAYS
3. TROUBLE FALLING OR STAYING ASLEEP OR SLEEPING TOO MUCH: MORE THAN HALF THE DAYS
2. FEELING DOWN, DEPRESSED, IRRITABLE, OR HOPELESS: MORE THAN HALF THE DAYS
9. THOUGHTS THAT YOU WOULD BE BETTER OFF DEAD, OR OF HURTING YOURSELF: NOT AT ALL
SUM OF ALL RESPONSES TO PHQ9 QUESTIONS 1 AND 2: 3
SUM OF ALL RESPONSES TO PHQ QUESTIONS 1-9: 9

## 2019-04-08 NOTE — PROGRESS NOTES
Chief Complaint   Patient presents with   • Annual Exam       HPI:  Celia is a 58 y.o. Est female who presents for annual exam.  Still under a lot of stress as her daughter is living in a car and continues with methamphetamine addiction for the past several years.  Work is going well.  Taking cymbalta 20 mg - decreases sex drive and moods are still not great.  No SI or HI.  Has lost 40 lbs over the past 2 years - exercising and quit drinking.    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   Regarding her health maintenance:   Last pap: 2017  Abnormal Pap hx: not in decades  Periods: menopausal  Hx of double mastectomy  Last colonoscopy:2012  Bone density test:N\A   Last Lab: done 11/2018 and perfect!  Done @ work.   Last Td:current   Influenza vaccination:current   Pneumococcal vaccination:not applicable   Zostavax:not applicable   Hx STD''s: no   Regular exercise: yes  Taking regis E and 5 HTP.      meds:   Current Outpatient Prescriptions   Medication Sig Dispense Refill   • acyclovir (ZOVIRAX) 400 MG tablet Take 1 Tab by mouth every day. 30 Tab 0   • DULoxetine (CYMBALTA) 20 MG Cap DR Particles Take 1 Cap by mouth every day. 90 Cap 2   • atorvastatin (LIPITOR) 20 MG Tab Take 1 Tab by mouth every bedtime. 90 Tab 3   • atorvastatin (LIPITOR) 10 MG Tab Take 1 Tab by mouth every bedtime. 90 Tab 2   • omeprazole (PRILOSEC) 40 MG delayed-release capsule Take 1 Cap by mouth every day. 90 Cap 2   • acyclovir (ZOVIRAX) 5 % Ointment APPLY TO AFFECTED AREA(S) THREE TIMES DAILY. 30 g 11   • Cholecalciferol (VITAMIN D PO) Take  by mouth.     • multivitamin (THERAGRAN) TABS Take 1 Tab by mouth every day.       No current facility-administered medications for this visit.        Allergies: No Known Allergies    family:   Family History   Problem Relation Age of Onset   • Hypertension Mother    • Arthritis Mother    • Stroke Mother    • Lung Disease Father         pulmonary fibrosis       social hx:   Social History      Social History   • Marital status:      Spouse name: N/A   • Number of children: N/A   • Years of education: N/A     Occupational History   • Not on file.     Social History Main Topics   • Smoking status: Former Smoker     Packs/day: 0.50     Years: 10.00     Types: Cigarettes     Quit date: 1/1/1991   • Smokeless tobacco: Never Used   • Alcohol use Yes      Comment: 2 per day   • Drug use: No   • Sexual activity: Not on file      Comment: , 1 child . working for the [employer]     Other Topics Concern   • Not on file     Social History Narrative   • No narrative on file       ROS:  No fever, chills, sweats.   No polydipsia, polyuria, temperature intolerance.  No visual changes, blurred vision.  No chest pain, palpitations, peripheral swelling   No chronic cough, shortness of breath, dyspnea with exertion.   No dysphagia, odynophagia, black or bloody stools.   No abdominal pain, nausea, persistent diarrhea, constipation   No dysuria, hematuria, incontinence. Denies nocturia  No rash, pruritis, pigment changes.   No focal weakness, syncope, headache, confusion, persistent numbness.   All other systems are reviewed and negative.    PHYSICAL EXAMINATION:  Vitals:    04/08/19 1356   BP: (!) 90/50   Pulse: 86   Resp: 12   Temp: 36.7 °C (98 °F)   SpO2: 95%       General appearance:healthy, well developed, well nourished  Psych: alert, no distress, cooperative  Eyes: EOM's normal, pupils equal, round, reactive to light  ENT: Ears: external ears normal to inspection and palpation, TM's clear, Nose/Sinuses: nose shows no deformity, asymmetry, or inflammation  Neck: no asymmetry, masses, or scars, no adenopathy, thyroid normal to palpation  Lungs:chest symmetric with normal A/P diameter, no chest deformities noted, normal respiratory rate and rhythm  Cardiovascular:regular rate and rhythm, S1 normal  Abdomen: umbilicus normal, no masses palpable, no organomegaly  Musculoskeletal:ROM of all joints is normal, no  evidence of joint instability  Lymphatic: None significantly enlarged  Skin: no rash, no edema  Neuro: mental status intact, cranial nerves 2-12 intact    Depression Screening    Little interest or pleasure in doing things?   Several days  Feeling down, depressed , or hopeless?  More than half the days  Trouble falling or staying asleep, or sleeping too much?   More than half the days  Feeling tired or having little energy?   More than half the days  Poor appetite or overeating?   Not at all  Feeling bad about yourself - or that you are a failure or have let yourself or your family down?  Not al all  Trouble concentrating on things, such as reading the newspaper or watching television?  More than half the days  Moving or speaking so slowly that other people could have noticed.  Or the opposite - being so fidgety or restless that you have been moving around a lot more than usual?   Not at all  Thoughts that you would be better off dead, or of hurting yourself?   Not at all  Patient Health Questionnaire Score:  9      If depressive symptoms identified deferred to follow up visit unless specifically addressed in assesment and plan.    Interpretation of PHQ-9 Total Score   Score Severity   1-4 No Depression   5-9 Mild Depression   10-14 Moderate Depression   15-19 Moderately Severe Depression   20-27 Severe Depression      ASSESSMENT/PLAN:  1.annual physical exam: HCM: Mammogram not indicated as she is had a double mastectomy.    Encourage daily exercise for at least 30 minutes  Recommend 1500 mg Calcium with 600 units vit d daily.    Recommend CBC, CMP, TSH, LP - fasting - declines as she had this done 11/2018 through work.   I changed her Cymbalta to Pristiq in hopes of decreasing the sexual dysfunction side effect.  Encouraged her to follow-up with me in 2 weeks on email and then in 6 weeks in person.  Recommend obtaining a CT cardiac scoring with her hyperlipidemia history, she would ultimately like to come off of  atorvastatin if her CT cardiac scoring is less than 100.  Colonoscopy and Pap smear are up-to-date.  Bone density repeat next year.

## 2019-04-30 DIAGNOSIS — F32.A DEPRESSION, UNSPECIFIED DEPRESSION TYPE: ICD-10-CM

## 2019-04-30 DIAGNOSIS — B00.9 HSV INFECTION: ICD-10-CM

## 2019-04-30 NOTE — TELEPHONE ENCOUNTER
Celia Sanchez Marjorie Block Ma   Phone Number: [phone]             Hi Shantel!   The transition to Pritiq (Generic) has been seamless. Thank you   Can you please send a prescription request for 90 days for both the Desvenlafaxine and Acyclovir to a new pharmacy (using [facility])     [facility] at [address] [place] [address]     Many thanks!   Celia

## 2019-05-01 RX ORDER — DESVENLAFAXINE SUCCINATE 50 MG/1
50 TABLET, EXTENDED RELEASE ORAL DAILY
Qty: 30 TAB | Refills: 5 | Status: SHIPPED | OUTPATIENT
Start: 2019-05-01 | End: 2019-05-08 | Stop reason: SDUPTHER

## 2019-05-01 RX ORDER — ACYCLOVIR 400 MG/1
400 TABLET ORAL
Qty: 90 TAB | Refills: 3 | Status: SHIPPED | OUTPATIENT
Start: 2019-05-01 | End: 2020-06-08

## 2019-05-08 ENCOUNTER — TELEPHONE (OUTPATIENT)
Dept: MEDICAL GROUP | Facility: LAB | Age: 59
End: 2019-05-08

## 2019-05-08 DIAGNOSIS — F32.A DEPRESSION, UNSPECIFIED DEPRESSION TYPE: ICD-10-CM

## 2019-05-08 RX ORDER — DESVENLAFAXINE SUCCINATE 50 MG/1
50 TABLET, EXTENDED RELEASE ORAL DAILY
Qty: 90 TAB | Refills: 3 | Status: SHIPPED | OUTPATIENT
Start: 2019-05-08 | End: 2021-09-23

## 2019-05-08 NOTE — TELEPHONE ENCOUNTER
Was the patient seen in the last year in this department? Yes LOV 4/8/19    Does patient have an active prescription for medications requested? Yes    Received Request Via: Patient

## 2019-05-08 NOTE — TELEPHONE ENCOUNTER
RE: Prescription Question   Received: Yesterday   Message Contents   Celia Riddle, Med Ass't   Phone Number: [phone]             Hi there,   I had requested 90 days for the Desvenlafaxine. The prescription I picked up was for 30. Is there a reason I couldn't get 90? (I am paying for the prescription without insurance, so it's considerably more expensive to buy monthly.)     Thank you,   Celia

## 2019-05-20 ENCOUNTER — HOSPITAL ENCOUNTER (OUTPATIENT)
Dept: RADIOLOGY | Facility: MEDICAL CENTER | Age: 59
End: 2019-05-20
Attending: NURSE PRACTITIONER

## 2019-05-20 DIAGNOSIS — E78.5 DYSLIPIDEMIA: ICD-10-CM

## 2019-05-20 DIAGNOSIS — Z91.89 OTHER SPECIFIED PERSONAL RISK FACTORS, NOT ELSEWHERE CLASSIFIED: ICD-10-CM

## 2019-05-20 PROCEDURE — 4410556 CT-CARDIAC SCORING

## 2019-05-21 DIAGNOSIS — E78.5 DYSLIPIDEMIA: ICD-10-CM

## 2019-10-02 DIAGNOSIS — F51.01 PRIMARY INSOMNIA: ICD-10-CM

## 2019-10-02 RX ORDER — ZOLPIDEM TARTRATE 5 MG/1
5 TABLET ORAL NIGHTLY PRN
Qty: 90 TAB | Refills: 2 | Status: SHIPPED
Start: 2019-10-02 | End: 2020-04-23 | Stop reason: SDUPTHER

## 2019-10-02 NOTE — TELEPHONE ENCOUNTER
----- Message from Celia Shaffer sent at 10/2/2019  8:48 AM PDT -----  Regarding: Prescription Question  Contact: [phone]  Sree Funes,     Can I please get a refill on Zolpidem 5 mg? 90 day supply to the [facility] [address])     The last script you wrote for me had a couple refills left on it but it  before I ever got it refilled.     Thanks so much!  Celia

## 2019-10-02 NOTE — TELEPHONE ENCOUNTER
Was the patient seen in the last year in this department? Yes  4/8/19  12/13/18  Does patient have an active prescription for medications requested? No     Received Request Via: Patient

## 2019-10-29 ENCOUNTER — OFFICE VISIT (OUTPATIENT)
Dept: MEDICAL GROUP | Facility: LAB | Age: 59
End: 2019-10-29
Payer: COMMERCIAL

## 2019-10-29 VITALS
TEMPERATURE: 98.1 F | HEIGHT: 67 IN | RESPIRATION RATE: 16 BRPM | BODY MASS INDEX: 25.43 KG/M2 | WEIGHT: 162 LBS | OXYGEN SATURATION: 97 % | HEART RATE: 66 BPM | DIASTOLIC BLOOD PRESSURE: 58 MMHG | SYSTOLIC BLOOD PRESSURE: 92 MMHG

## 2019-10-29 DIAGNOSIS — Z11.59 NEED FOR HEPATITIS C SCREENING TEST: ICD-10-CM

## 2019-10-29 DIAGNOSIS — D53.9 MACROCYTIC ANEMIA: ICD-10-CM

## 2019-10-29 DIAGNOSIS — F33.42 RECURRENT MAJOR DEPRESSIVE DISORDER, IN FULL REMISSION (HCC): ICD-10-CM

## 2019-10-29 DIAGNOSIS — E78.5 DYSLIPIDEMIA: ICD-10-CM

## 2019-10-29 PROCEDURE — 99214 OFFICE O/P EST MOD 30 MIN: CPT | Performed by: NURSE PRACTITIONER

## 2019-10-29 RX ORDER — MULTIVIT WITH MINERALS/LUTEIN
1 TABLET ORAL DAILY
COMMUNITY
End: 2024-03-11

## 2019-10-29 RX ORDER — CHLORAL HYDRATE 500 MG
1000 CAPSULE ORAL
COMMUNITY

## 2019-10-29 NOTE — ASSESSMENT & PLAN NOTE
Chronic issue. Improving. Cut out alcohol 2016.  Donates blood several times per year - now hgb is down to 11.6 and RBC 3.63.

## 2019-10-29 NOTE — PROGRESS NOTES
"Chief Complaint   Patient presents with   • Depression     follow up meds   • Results     labs from [facility] review       HPI   Celia is a 59-year-old established female here to follow-up on a couple of things:  1-dyslipidemia - neg CT cardiac scoring  Chronic issue.  LDL is up to 176 on recent lab work ordered through work.  Off statins.  Neg CT cardiac scoring 2019.  Non-smoker.  Denies chest pain or headaches.    2-Macrocytic anemia  Chronic issue. Improving. Cut out alcohol .  Donates blood several times per year - now hgb is down to 11.6 and RBC 3.63.  Occasionally feels a bit tired but denies shortness of breath.  Denies hematochezia, hematuria, headaches, epistaxis or easy bruising.    #3-depression:  Chronic and doing well on Pristiq.  Happy to be off Cymbalta.  Moods have been very stable.  Sleeping well at night.  Constantly worries about her daughter.  Denies suicidal or homicidal ideations.    Past medical, surgical, family, and social history is reviewed and updated in Epic chart by me today.   Medications and allergies reviewed and updated in Epic chart by me today.     ROS:   As documented in history of present illness above    Exam:  BP (!) 92/58 (BP Location: Left arm, Patient Position: Sitting, BP Cuff Size: Large adult)   Pulse 66   Temp 36.7 °C (98.1 °F)   Resp 16   Ht 1.702 m (5' 7\")   Wt 73.5 kg (162 lb)   SpO2 97%   Constitutional: Alert, no distress, plus 3 vital signs  Skin:  Warm, dry, no rashes invisible areas  Eye: Equal, round and reactive, conjunctiva clear  ENMT: Lips without lesions.  Respiratory: Unlabored respiration, lungs clear to auscultation, no wheezes, no rhonchi  Cardiovascular: Normal rate and rhythm.  Psych: Alert, pleasant, well-groomed, normal affect    Assessment / Plan / Medical Decision makin. Dyslipidemia  -Negative CT cardiac scoring prefers to stay off statin therapy.  Discussed the importance of high fiber intake and exercise.  Repeat CT " cardiac scoring 5 years.    2. Macrocytic anemia  -Improving.  Has cut out alcohol.  She is now a bit microcytic anemic.  Recommend refraining from donating blood every 3 months and moving this to every 6 months.  Repeat CBC with an iron, B12 and folate in 3 months.  - CBC WITH DIFFERENTIAL; Future  - VITAMIN B12; Future  - FOLATE; Future  - IRON/TOTAL IRON BIND; Future    3. Need for hepatitis C screening test  - HEP C VIRUS ANTIBODY; Future    4. Recurrent major depressive disorder, in full remission (HCC)  -Stable.  Continue Pristiq.  Using Ambien as needed.

## 2020-04-13 ENCOUNTER — APPOINTMENT (OUTPATIENT)
Dept: MEDICAL GROUP | Facility: LAB | Age: 60
End: 2020-04-13
Payer: COMMERCIAL

## 2020-04-23 ENCOUNTER — PATIENT MESSAGE (OUTPATIENT)
Dept: MEDICAL GROUP | Facility: LAB | Age: 60
End: 2020-04-23

## 2020-04-23 DIAGNOSIS — F51.01 PRIMARY INSOMNIA: ICD-10-CM

## 2020-04-23 RX ORDER — ZOLPIDEM TARTRATE 5 MG/1
5 TABLET ORAL NIGHTLY PRN
Qty: 30 TAB | Refills: 3 | Status: SHIPPED | OUTPATIENT
Start: 2020-04-23 | End: 2021-07-22 | Stop reason: SDUPTHER

## 2020-04-23 NOTE — PATIENT COMMUNICATION
FW: Prescription Question   Received: Today   Message Contents   HOMAR Lam, [facility]   Phone Number: [phone]             Okay to set up refill request for patient's Ambien, #30 with 3 refills.   Thanks    Patient has been getting 90 day at a time?  Received request via: Patient   Last Fill 1/13/20 #90  Was the patient seen in the last year in this department? Yes    Does the patient have an active prescription (recently filled or refills available) for medication(s) requested? No

## 2020-04-23 NOTE — TELEPHONE ENCOUNTER
----- Message from JOSÉ MIGUEL LamPAcostaNAcosta sent at 2020 11:07 AM PDT -----  Regarding: FW: Prescription Question  Contact: [phone]  Okay to set up refill request for patient's Ambien, #30 with 3 refills.  Thanks  ----- Message -----  From: Keely Meléndez, Med Ass't  Sent: 2020  10:27 AM PDT  To: JOSÉ MIGUEL LamPAcostaNAcosta  Subject: FW: Prescription Question                          ----- Message -----  From: Celia Shaffer  Sent: 2020  10:24 AM PDT  To: Keely Meléndez, Med Ass't  Subject: RE: Prescription Question                        Nope. Was originated on 10/2/19 and  on 3/30/20. The prescription in October was for three, (the original and two refills) and I only did the original.  I came in to the office in October because of this very issue (and no health concerns) and Shantel and I talked about it and she said to just send a message next time which is what I am doing.   I did have an appointment scheduled for  which I cancelled as it was just routine and I didn't have my labs done as they too were routine. When I feel comfortable going to a lab, I will do that and will schedule a follow up with your office.   Until then will you please ask Shantel?    ----- Message -----  From: Keely Meléndez, Med Ass't  Sent: 20, 10:07 AM  To: Celia Shaffer  Subject: RE: Prescription Question    Sree Yang,    It does look like that prescription  on 19. You would need an appointment for a refill. We are currently offering virtual visits so that patients do not have to physically come to office. Would you like me to set you up with one?      ----- Message -----       From:Celia Shaffer       Sent:2020 10:04 AM PDT         To:JOSÉ MIGUEL LamP.N.    Subject:Prescription Question    Sree Funes,    I am trying to fill my generic 5mg Ambien. The prescription was mistakenly sent to [facility] (they are whatever  word is a degree worse than horrible.)  I filled it the first time and had two refills left. I requested my second and when it was supposed to be done, they hadn't filled it. I tried again. It said the prescription was , would I like for them to contact your office. I said yes. Pickup was scheduled for Friday, 4 days later. I called to verify it was ready and it told me that the prescription was  and asked if I would like them to contact your office. Groundhog day.   Can you please phone in a prescription for 5mg Zolpidem to [facility] on [address]?  [phone]?    Thank you so very much,  Celia       - - - DISCLAIMER - - -  [email or web address][This message may contain formatting that cannot be shown on this site.]

## 2020-06-07 DIAGNOSIS — B00.9 HSV INFECTION: ICD-10-CM

## 2020-06-08 RX ORDER — ACYCLOVIR 400 MG/1
TABLET ORAL
Qty: 90 TAB | Refills: 1 | Status: SHIPPED | OUTPATIENT
Start: 2020-06-08 | End: 2020-12-31

## 2020-06-08 NOTE — TELEPHONE ENCOUNTER
Received request via: Patient    Was the patient seen in the last year in this department? Yes  10/29/2019  Does the patient have an active prescription (recently filled or refills available) for medication(s) requested? No

## 2020-07-18 NOTE — TELEPHONE ENCOUNTER
Was the patient seen in the last year in this department? Yes     Does patient have an active prescription for medications requested? No     Received Request Via: Pharmacy   yes

## 2020-11-19 ENCOUNTER — HOSPITAL ENCOUNTER (OUTPATIENT)
Facility: MEDICAL CENTER | Age: 60
End: 2020-11-19
Attending: OBSTETRICS & GYNECOLOGY
Payer: COMMERCIAL

## 2020-11-19 PROCEDURE — 87086 URINE CULTURE/COLONY COUNT: CPT

## 2020-11-22 LAB
BACTERIA UR CULT: NORMAL
SIGNIFICANT IND 70042: NORMAL
SITE SITE: NORMAL
SOURCE SOURCE: NORMAL

## 2020-12-31 DIAGNOSIS — B00.9 HSV INFECTION: ICD-10-CM

## 2020-12-31 RX ORDER — ACYCLOVIR 400 MG/1
TABLET ORAL
Qty: 90 TAB | Refills: 0 | Status: SHIPPED | OUTPATIENT
Start: 2020-12-31 | End: 2021-04-26 | Stop reason: SDUPTHER

## 2020-12-31 NOTE — TELEPHONE ENCOUNTER
Received request via: Pharmacy    Was the patient seen in the last year in this department? No   12/29/19  Does the patient have an active prescription (recently filled or refills available) for medication(s) requested? No

## 2021-02-16 ENCOUNTER — PATIENT MESSAGE (OUTPATIENT)
Dept: MEDICAL GROUP | Facility: LAB | Age: 61
End: 2021-02-16

## 2021-02-16 DIAGNOSIS — Z11.1 ENCOUNTER FOR SPECIAL SCREENING EXAMINATION FOR RESPIRATORY TUBERCULOSIS: ICD-10-CM

## 2021-02-22 ENCOUNTER — HOSPITAL ENCOUNTER (OUTPATIENT)
Dept: LAB | Facility: MEDICAL CENTER | Age: 61
End: 2021-02-22
Attending: NURSE PRACTITIONER
Payer: COMMERCIAL

## 2021-02-22 DIAGNOSIS — Z11.1 ENCOUNTER FOR SPECIAL SCREENING EXAMINATION FOR RESPIRATORY TUBERCULOSIS: ICD-10-CM

## 2021-02-22 PROCEDURE — 86480 TB TEST CELL IMMUN MEASURE: CPT

## 2021-02-22 PROCEDURE — 36415 COLL VENOUS BLD VENIPUNCTURE: CPT

## 2021-02-24 LAB
GAMMA INTERFERON BACKGROUND BLD IA-ACNC: 0.03 IU/ML
M TB IFN-G BLD-IMP: NEGATIVE
M TB IFN-G CD4+ BCKGRND COR BLD-ACNC: 0 IU/ML
MITOGEN IGNF BCKGRD COR BLD-ACNC: >10 IU/ML
QFT TB2 - NIL TBQ2: 0 IU/ML

## 2021-03-15 DIAGNOSIS — Z23 NEED FOR VACCINATION: ICD-10-CM

## 2021-04-26 DIAGNOSIS — B00.9 HSV INFECTION: ICD-10-CM

## 2021-04-26 RX ORDER — ACYCLOVIR 400 MG/1
400 TABLET ORAL 3 TIMES DAILY
Qty: 90 TABLET | Refills: 0 | Status: SHIPPED | OUTPATIENT
Start: 2021-04-26 | End: 2021-10-17 | Stop reason: SDUPTHER

## 2021-04-26 NOTE — TELEPHONE ENCOUNTER
Received request via: Pharmacy    Was the patient seen in the last year in this department? No   10/29/2019  Does the patient have an active prescription (recently filled or refills available) for medication(s) requested? No

## 2021-07-22 ENCOUNTER — OFFICE VISIT (OUTPATIENT)
Dept: MEDICAL GROUP | Facility: LAB | Age: 61
End: 2021-07-22
Payer: COMMERCIAL

## 2021-07-22 VITALS
DIASTOLIC BLOOD PRESSURE: 58 MMHG | BODY MASS INDEX: 25.79 KG/M2 | HEIGHT: 67 IN | WEIGHT: 164.3 LBS | SYSTOLIC BLOOD PRESSURE: 98 MMHG | HEART RATE: 74 BPM | TEMPERATURE: 98.1 F | OXYGEN SATURATION: 92 %

## 2021-07-22 DIAGNOSIS — K27.9 PUD (PEPTIC ULCER DISEASE): ICD-10-CM

## 2021-07-22 DIAGNOSIS — J20.9 ACUTE BRONCHITIS, UNSPECIFIED ORGANISM: ICD-10-CM

## 2021-07-22 DIAGNOSIS — F51.01 PRIMARY INSOMNIA: ICD-10-CM

## 2021-07-22 PROCEDURE — 99214 OFFICE O/P EST MOD 30 MIN: CPT | Performed by: NURSE PRACTITIONER

## 2021-07-22 RX ORDER — PREDNISONE 20 MG/1
40 TABLET ORAL DAILY
Qty: 10 TABLET | Refills: 0 | Status: SHIPPED | OUTPATIENT
Start: 2021-07-22 | End: 2021-09-02 | Stop reason: SDUPTHER

## 2021-07-22 RX ORDER — OMEPRAZOLE 20 MG/1
20 CAPSULE, DELAYED RELEASE ORAL DAILY
Qty: 90 CAPSULE | Refills: 2 | Status: SHIPPED | OUTPATIENT
Start: 2021-07-22 | End: 2023-08-07

## 2021-07-22 RX ORDER — DOXYCYCLINE HYCLATE 100 MG
100 TABLET ORAL 2 TIMES DAILY
Qty: 14 TABLET | Refills: 0 | Status: SHIPPED | OUTPATIENT
Start: 2021-07-22 | End: 2021-07-29

## 2021-07-22 RX ORDER — ZOLPIDEM TARTRATE 5 MG/1
5 TABLET ORAL NIGHTLY PRN
Qty: 30 TABLET | Refills: 3 | Status: SHIPPED | OUTPATIENT
Start: 2021-07-22 | End: 2021-10-20

## 2021-07-22 NOTE — PROGRESS NOTES
"    HPI   Celia is a 61-year-old established female here with c/o new onset malaise, congestion, HA and cough x the past 2 weeks.  Mild ear pain.  Symptoms seem to be slowly improving.  Has tried OTC cough / cold meds without improvement.  Mucus: clear / light colored.   Nonsmoker.  + sick contacts - grandson who lives with her.  Negative covid test last Friday.  Concerned with weakness as she is usually very physically active.  Nonsmoker.  Vaccinated for covid.   + hx of pna.  Has     Insomnia: Chronic issue. Requesting a refill of Ambien which she takes sparingly, typically 90 pills last her for about a year.    Peptic ulcer disease: Intermittent issue. Requesting refill of omeprazole which she takes as needed for flareups. No current symptoms. Unable to take NSAIDs because of recurrent epigastric discomfort.    Past medical, surgical, family, and social history is reviewed and updated in Epic chart by me today.   Medications and allergies reviewed and updated in Epic chart by me today.     ROS:   Denies n/v/d or abdominal pain.     Exam:  BP (!) 98/58   Pulse 74   Temp 36.7 °C (98.1 °F)   Ht 1.702 m (5' 7\")   Wt 74.5 kg (164 lb 4.8 oz)   SpO2 92%     Constitutional: Alert, no distress, plus 3 vital signs  Skin:  Warm, dry, no rashes invisible areas  Eye: Equal, round and reactive, conjunctiva clear  ENMT: Bilateral tympanic membranes are retracted but translucent without erythema or perforation. No sinus tenderness. Oropharynx is slightly injected without exudate. No tonsillar enlargement.    Neck: Mild anterior cervical, nontender lymphadenopathy  Respiratory: Unlabored respiration, lungs clear to auscultation, no wheezes, no rhonchi. She does have a very strong cough in the room.  Cardiovascular: Normal rate and rhythm  Psych: Alert, pleasant, well-groomed, normal affect      A/P:  \"  1. Primary insomnia  zolpidem (AMBIEN) 5 MG Tab   2. Acute bronchitis, unspecified organism  predniSONE (DELTASONE) 20 " "MG Tab    doxycycline (VIBRAMYCIN) 100 MG Tab   3. PUD (peptic ulcer disease)  omeprazole (PRILOSEC) 20 MG delayed-release capsule   \"  Suspect viral bronchitis. Symptoms are improving. Because of her strong cough that does not seem to be resolving, added on prednisone 40 mg daily for the next 3 to 5 days until cough resolves. Recommend starting doxycycline in 72 hours if symptoms regress. Discussed symptoms of bacterial versus viral infections. Discussed the importance of high water intake, deep breathing exercises and good nutrition as well as getting some sleep at night. Discussed strong cough suppressants. She will let me know how she is feeling early next week.    Refilled Ambien and omeprazole, those conditions are stable. Reviewed  profile which shows last refill of Ambien was last August prescribed by myself. Discussed potential for sleepwalking, sleep eating, sleep driving and memory loss with use of Ambien.    Side effects of all medications prescribed today were discussed with the patient including how to take the medications and proper dosage. Discussed repercussions of not taking the medications as prescribed. Instructed to call the office should she have any negative side effects or problems with the medications.      "

## 2021-08-02 ENCOUNTER — HOSPITAL ENCOUNTER (OUTPATIENT)
Dept: RADIOLOGY | Facility: MEDICAL CENTER | Age: 61
End: 2021-08-02
Attending: NURSE PRACTITIONER
Payer: COMMERCIAL

## 2021-08-02 ENCOUNTER — PATIENT MESSAGE (OUTPATIENT)
Dept: MEDICAL GROUP | Facility: LAB | Age: 61
End: 2021-08-02

## 2021-08-02 DIAGNOSIS — J40 BRONCHITIS: ICD-10-CM

## 2021-08-02 PROCEDURE — 71046 X-RAY EXAM CHEST 2 VIEWS: CPT

## 2021-09-02 DIAGNOSIS — J20.9 ACUTE BRONCHITIS, UNSPECIFIED ORGANISM: ICD-10-CM

## 2021-09-02 RX ORDER — AMOXICILLIN AND CLAVULANATE POTASSIUM 875; 125 MG/1; MG/1
1 TABLET, FILM COATED ORAL 2 TIMES DAILY
Qty: 14 TABLET | Refills: 0 | Status: SHIPPED | OUTPATIENT
Start: 2021-09-02 | End: 2021-09-09

## 2021-09-02 RX ORDER — PREDNISONE 20 MG/1
40 TABLET ORAL DAILY
Qty: 10 TABLET | Refills: 0 | Status: SHIPPED | OUTPATIENT
Start: 2021-09-02 | End: 2021-09-07

## 2021-09-23 ENCOUNTER — OFFICE VISIT (OUTPATIENT)
Dept: MEDICAL GROUP | Facility: LAB | Age: 61
End: 2021-09-23
Payer: COMMERCIAL

## 2021-09-23 ENCOUNTER — HOSPITAL ENCOUNTER (OUTPATIENT)
Facility: MEDICAL CENTER | Age: 61
End: 2021-09-23
Attending: NURSE PRACTITIONER
Payer: COMMERCIAL

## 2021-09-23 VITALS
BODY MASS INDEX: 25.27 KG/M2 | HEIGHT: 67 IN | RESPIRATION RATE: 12 BRPM | HEART RATE: 80 BPM | OXYGEN SATURATION: 98 % | TEMPERATURE: 97.7 F | DIASTOLIC BLOOD PRESSURE: 50 MMHG | WEIGHT: 161 LBS | SYSTOLIC BLOOD PRESSURE: 90 MMHG

## 2021-09-23 DIAGNOSIS — Z23 NEED FOR PNEUMOCOCCAL VACCINATION: ICD-10-CM

## 2021-09-23 DIAGNOSIS — Z12.4 SCREENING FOR MALIGNANT NEOPLASM OF CERVIX: ICD-10-CM

## 2021-09-23 DIAGNOSIS — Z00.00 PREVENTATIVE HEALTH CARE: ICD-10-CM

## 2021-09-23 DIAGNOSIS — F43.21 SITUATIONAL DEPRESSION: ICD-10-CM

## 2021-09-23 DIAGNOSIS — Z23 NEED FOR INFLUENZA VACCINATION: ICD-10-CM

## 2021-09-23 DIAGNOSIS — Z01.419 ENCOUNTER FOR GYNECOLOGICAL EXAMINATION: ICD-10-CM

## 2021-09-23 PROCEDURE — 90732 PPSV23 VACC 2 YRS+ SUBQ/IM: CPT | Performed by: NURSE PRACTITIONER

## 2021-09-23 PROCEDURE — 90686 IIV4 VACC NO PRSV 0.5 ML IM: CPT | Performed by: NURSE PRACTITIONER

## 2021-09-23 PROCEDURE — 90471 IMMUNIZATION ADMIN: CPT | Performed by: NURSE PRACTITIONER

## 2021-09-23 PROCEDURE — 99396 PREV VISIT EST AGE 40-64: CPT | Mod: 25 | Performed by: NURSE PRACTITIONER

## 2021-09-23 PROCEDURE — 90472 IMMUNIZATION ADMIN EACH ADD: CPT | Performed by: NURSE PRACTITIONER

## 2021-09-23 PROCEDURE — 88175 CYTOPATH C/V AUTO FLUID REDO: CPT

## 2021-09-23 RX ORDER — BUPROPION HYDROCHLORIDE 150 MG/1
150 TABLET ORAL EVERY MORNING
Qty: 30 TABLET | Refills: 5 | Status: SHIPPED | OUTPATIENT
Start: 2021-09-23 | End: 2021-12-07 | Stop reason: SDUPTHER

## 2021-09-23 ASSESSMENT — PATIENT HEALTH QUESTIONNAIRE - PHQ9
8. MOVING OR SPEAKING SO SLOWLY THAT OTHER PEOPLE COULD HAVE NOTICED. OR THE OPPOSITE, BEING SO FIGETY OR RESTLESS THAT YOU HAVE BEEN MOVING AROUND A LOT MORE THAN USUAL: NOT AT ALL
SUM OF ALL RESPONSES TO PHQ QUESTIONS 1-9: 13
2. FEELING DOWN, DEPRESSED, IRRITABLE, OR HOPELESS: NEARLY EVERY DAY
6. FEELING BAD ABOUT YOURSELF - OR THAT YOU ARE A FAILURE OR HAVE LET YOURSELF OR YOUR FAMILY DOWN: NOT AL ALL
4. FEELING TIRED OR HAVING LITTLE ENERGY: NEARLY EVERY DAY
SUM OF ALL RESPONSES TO PHQ9 QUESTIONS 1 AND 2: 6
9. THOUGHTS THAT YOU WOULD BE BETTER OFF DEAD, OR OF HURTING YOURSELF: NOT AT ALL
5. POOR APPETITE OR OVEREATING: NOT AT ALL
1. LITTLE INTEREST OR PLEASURE IN DOING THINGS: NEARLY EVERY DAY
7. TROUBLE CONCENTRATING ON THINGS, SUCH AS READING THE NEWSPAPER OR WATCHING TELEVISION: SEVERAL DAYS
3. TROUBLE FALLING OR STAYING ASLEEP OR SLEEPING TOO MUCH: NEARLY EVERY DAY

## 2021-09-23 NOTE — PROGRESS NOTES
Chief Complaint   Patient presents with   • Annual Exam     PAP       HPI:  Celia is a 61 y.o.  female who presents for annual exam.   Denies any physical complaints today.  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  Feels very limited in her time and isolated.  Off SNRI therapy 2 years ago but feels that she needs something for mild depression.  Denies SI or HI.  Regarding her health maintenance:   Last pap: 2017  Abnormal Pap hx: none  Menopausal.  No HRT.  Last Mammo:not applicable - double mastectomy age 32.   Last colonoscopy: due next year.   Bone density test:N\A   Last Lab: due for follow up   Last Td:current   Influenza vaccination:current   Pneumococcal vaccination:not applicable   Hx STD''s: no   Regular exercise: yes  Has grandson living with her.        meds:   Current Outpatient Medications   Medication Sig Dispense Refill   • zolpidem (AMBIEN) 5 MG Tab Take 1 tablet by mouth at bedtime as needed for Sleep for up to 90 days. 30 tablet 3   • omeprazole (PRILOSEC) 20 MG delayed-release capsule Take 1 capsule by mouth every day. 90 capsule 2   • acyclovir (ZOVIRAX) 400 MG tablet Take 1 tablet by mouth 3 times a day. 90 tablet 0   • vitamin E (VITAMIN E) 1000 UNIT Cap Take 1 Cap by mouth every day.     • Omega-3 Fatty Acids (FISH OIL) 1000 MG Cap capsule Take 1,000 mg by mouth 3 times a day, with meals.     • desvenlafaxine succinate (PRISTIQ) 50 MG TABLET SR 24 HR Take 1 Tab by mouth every day. 90 Tab 3   • acyclovir (ZOVIRAX) 5 % Ointment APPLY TO AFFECTED AREA(S) THREE TIMES DAILY. 30 g 11   • Cholecalciferol (VITAMIN D PO) Take  by mouth.     • multivitamin (THERAGRAN) TABS Take 1 Tab by mouth every day.       No current facility-administered medications for this visit.       Allergies: No Known Allergies    family:   Family History   Problem Relation Age of Onset   • Hypertension Mother    • Arthritis Mother    • Stroke Mother    • Lung Disease Father         pulmonary fibrosis       social hx:    Social History     Socioeconomic History   • Marital status:      Spouse name: Not on file   • Number of children: Not on file   • Years of education: Not on file   • Highest education level: Not on file   Occupational History   • Not on file   Tobacco Use   • Smoking status: Former Smoker     Packs/day: 0.50     Years: 10.00     Pack years: 5.00     Types: Cigarettes     Quit date: 1991     Years since quittin.7   • Smokeless tobacco: Never Used   Vaping Use   • Vaping Use: Never used   Substance and Sexual Activity   • Alcohol use: Yes     Comment: 2 per day   • Drug use: No   • Sexual activity: Not on file     Comment: , 1 child . working for the [employer]   Other Topics Concern   • Not on file   Social History Narrative   • Not on file     Social Determinants of Health     Financial Resource Strain:    • Difficulty of Paying Living Expenses:    Food Insecurity:    • Worried About Running Out of Food in the Last Year:    • Ran Out of Food in the Last Year:    Transportation Needs:    • Lack of Transportation (Medical):    • Lack of Transportation (Non-Medical):    Physical Activity:    • Days of Exercise per Week:    • Minutes of Exercise per Session:    Stress:    • Feeling of Stress :    Social Connections:    • Frequency of Communication with Friends and Family:    • Frequency of Social Gatherings with Friends and Family:    • Attends [employer] Services:    • Active Member of Clubs or Organizations:    • Attends Club or Organization Meetings:    • Marital Status:    Intimate Partner Violence:    • Fear of Current or Ex-Partner:    • Emotionally Abused:    • Physically Abused:    • Sexually Abused:        ROS:  No fever, chills, sweats.   No polydipsia, polyuria, temperature intolerance, significant weight changes   No visual changes, blurred vision.  No chest pain, palpitations, peripheral swelling   No chronic cough, shortness of breath, dyspnea with exertion.   No dysphagia,  "odynophagia, black or bloody stools.   No abdominal pain, nausea, persistent diarrhea, constipation   No dysuria, hematuria, incontinence. Denies nocturia  No rash, pruritis, pigment changes.   No focal weakness, syncope, headache, confusion, persistent numbness.   All other systems are reviewed and negative.    PHYSICAL EXAMINATION:  BP (!) 90/50 (BP Location: Right arm, Patient Position: Sitting, BP Cuff Size: Adult)   Pulse 80   Temp 36.5 °C (97.7 °F)   Resp 12   Ht 1.702 m (5' 7\")   Wt 73 kg (161 lb)   SpO2 98%   General appearance:healthy, well developed, well nourished  Psych: alert, no distress, cooperative  Eyes: EOM's normal, pupils equal, round, reactive to light  ENT: Ears: external ears normal to inspection and palpation, TM's clear, Nose/Sinuses: nose shows no deformity, asymmetry, or inflammation  Neck: no asymmetry, masses, or scars, no adenopathy, thyroid normal to palpation  Lungs:chest symmetric with normal A/P diameter, no chest deformities noted, normal respiratory rate and rhythm  Cardiovascular:regular rate and rhythm, S1 normal  Breasts: Bilateral breast implants in place.  No masses or tenderness surrounding breast implants.  No axillary lymphadenopathy.  Abdomen: umbilicus normal, no masses palpable, no organomegaly  Musculoskeletal:ROM of all joints is normal, no evidence of joint instability  Lymphatic: None significantly enlarged  Skin: no rash, no edema  Neuro: mental status intact, cranial nerves 2-12 intact  Pelvic: external genitalia normal, cervix normal in appearance, bimanual exam reveals normal uterus, adnexa without masses or tenderness, vaginal mucosa normal      ASSESSMENT/PLAN:  1.annual physical exam: HCM:  Pap and breast exams done.  BSE technique reviewed and patient encouraged to perform self-exam monthly.   Encourage monthly self breast exam  Encourage daily exercise for at least 30 minutes  Recommend 1500 mg Calcium with 600 units vit d daily.    Recommend a full " updated lab panel.  PCV 23 and influenza given today.  Initiated Wellbutrin 150 mg XL once daily in the morning in hopes of helping with situational depression, lower energy levels and fatigue.  She will follow-up with me in 4 weeks regarding how she is feeling.    Patient was counseled regarding all immunizations, what the patient is being immunized against, possible side effects, and the importance of immunization.   Side effects of all medications prescribed today were discussed with the patient including how to take the medications and proper dosage. Discussed repercussions of not taking the medications as prescribed. Instructed to call the office should she have any negative side effects or problems with the medications.

## 2021-09-24 DIAGNOSIS — Z12.4 SCREENING FOR MALIGNANT NEOPLASM OF CERVIX: ICD-10-CM

## 2021-09-24 LAB — CYTOLOGY REG CYTOL: NORMAL

## 2021-10-17 DIAGNOSIS — B00.9 HSV INFECTION: ICD-10-CM

## 2021-10-18 RX ORDER — ACYCLOVIR 400 MG/1
400 TABLET ORAL 3 TIMES DAILY
Qty: 90 TABLET | Refills: 0 | Status: SHIPPED | OUTPATIENT
Start: 2021-10-18 | End: 2022-03-14 | Stop reason: SDUPTHER

## 2021-10-18 NOTE — TELEPHONE ENCOUNTER
Received request via: Pharmacy    Was the patient seen in the last year in this department? Yes  9/23/21  Does the patient have an active prescription (recently filled or refills available) for medication(s) requested? No

## 2021-12-07 DIAGNOSIS — F43.21 SITUATIONAL DEPRESSION: ICD-10-CM

## 2021-12-07 RX ORDER — BUPROPION HYDROCHLORIDE 150 MG/1
150 TABLET ORAL EVERY MORNING
Qty: 90 TABLET | Refills: 1 | Status: SHIPPED | OUTPATIENT
Start: 2021-12-07 | End: 2022-01-25 | Stop reason: SDUPTHER

## 2021-12-07 NOTE — TELEPHONE ENCOUNTER
----- Message from Celia Shaffer sent at 12/7/2021 12:56 PM PST -----  Regarding: Wellbutrin   Sree Funes, would it be possible to up my dosage on the Wellbutrin? Still struggling…

## 2022-01-24 DIAGNOSIS — F51.01 PRIMARY INSOMNIA: ICD-10-CM

## 2022-01-24 RX ORDER — ZOLPIDEM TARTRATE 5 MG/1
TABLET ORAL
Qty: 30 TABLET | Refills: 2 | Status: SHIPPED | OUTPATIENT
Start: 2022-01-24 | End: 2023-04-06 | Stop reason: SDUPTHER

## 2022-01-24 NOTE — TELEPHONE ENCOUNTER
Received request via: Pharmacy    Was the patient seen in the last year in this department? Yes  LOV : 9/23/2021  Does the patient have an active prescription (recently filled or refills available) for medication(s) requested? No   How Severe Are Your Spot(S)?: mild Have Your Spot(S) Been Treated In The Past?: has not been treated Hpi Title: Evaluation of Skin Lesions

## 2022-01-25 DIAGNOSIS — F43.21 SITUATIONAL DEPRESSION: ICD-10-CM

## 2022-01-25 RX ORDER — BUPROPION HYDROCHLORIDE 300 MG/1
300 TABLET ORAL EVERY MORNING
Qty: 30 TABLET | Refills: 2 | Status: SHIPPED
Start: 2022-01-25 | End: 2022-08-08

## 2022-03-14 DIAGNOSIS — B00.9 HSV INFECTION: ICD-10-CM

## 2022-03-14 RX ORDER — ACYCLOVIR 400 MG/1
400 TABLET ORAL 3 TIMES DAILY
Qty: 90 TABLET | Refills: 0 | Status: SHIPPED | OUTPATIENT
Start: 2022-03-14 | End: 2022-08-02

## 2022-08-02 DIAGNOSIS — B00.9 HSV INFECTION: ICD-10-CM

## 2022-08-02 RX ORDER — ACYCLOVIR 400 MG/1
400 TABLET ORAL 3 TIMES DAILY
Qty: 90 TABLET | Refills: 0 | Status: SHIPPED | OUTPATIENT
Start: 2022-08-02 | End: 2022-09-26 | Stop reason: SDUPTHER

## 2022-08-08 ENCOUNTER — OFFICE VISIT (OUTPATIENT)
Dept: MEDICAL GROUP | Facility: LAB | Age: 62
End: 2022-08-08
Payer: COMMERCIAL

## 2022-08-08 VITALS
BODY MASS INDEX: 23.7 KG/M2 | SYSTOLIC BLOOD PRESSURE: 90 MMHG | TEMPERATURE: 97.9 F | WEIGHT: 151 LBS | RESPIRATION RATE: 12 BRPM | HEIGHT: 67 IN | DIASTOLIC BLOOD PRESSURE: 56 MMHG | HEART RATE: 60 BPM | OXYGEN SATURATION: 98 %

## 2022-08-08 DIAGNOSIS — M25.512 CHRONIC LEFT SHOULDER PAIN: ICD-10-CM

## 2022-08-08 DIAGNOSIS — Z12.11 SPECIAL SCREENING FOR MALIGNANT NEOPLASM OF COLON: ICD-10-CM

## 2022-08-08 DIAGNOSIS — G89.29 CHRONIC LEFT SHOULDER PAIN: ICD-10-CM

## 2022-08-08 DIAGNOSIS — T85.44XA CAPSULAR CONTRACTURE OF BREAST IMPLANT, INITIAL ENCOUNTER: ICD-10-CM

## 2022-08-08 DIAGNOSIS — F51.01 PRIMARY INSOMNIA: ICD-10-CM

## 2022-08-08 PROBLEM — M54.2 CHRONIC NECK PAIN: Status: ACTIVE | Noted: 2022-02-03

## 2022-08-08 PROBLEM — F32.A DEPRESSION: Status: ACTIVE | Noted: 2022-02-03

## 2022-08-08 PROBLEM — C50.919 MALIGNANT NEOPLASM OF BREAST (HCC): Status: ACTIVE | Noted: 2022-02-03

## 2022-08-08 PROBLEM — K27.9 PUD (PEPTIC ULCER DISEASE): Status: ACTIVE | Noted: 2022-02-03

## 2022-08-08 PROBLEM — E03.9 HYPOTHYROIDISM: Status: ACTIVE | Noted: 2022-02-03

## 2022-08-08 PROCEDURE — 99214 OFFICE O/P EST MOD 30 MIN: CPT | Performed by: NURSE PRACTITIONER

## 2022-08-08 RX ORDER — TRAZODONE HYDROCHLORIDE 50 MG/1
50 TABLET ORAL NIGHTLY
Qty: 30 TABLET | Refills: 3 | Status: SHIPPED | OUTPATIENT
Start: 2022-08-08 | End: 2022-09-26 | Stop reason: SDUPTHER

## 2022-08-08 ASSESSMENT — PATIENT HEALTH QUESTIONNAIRE - PHQ9
CLINICAL INTERPRETATION OF PHQ2 SCORE: 2
5. POOR APPETITE OR OVEREATING: 1 - SEVERAL DAYS
SUM OF ALL RESPONSES TO PHQ QUESTIONS 1-9: 11

## 2022-08-08 NOTE — PROGRESS NOTES
"CC  f/u      HPI:  Celia is a 62-year-old established female here with a couple of issues:  #1- breast implant contracture:  Initially noted a few months ago and area is getting tighter / uncomfortable.  Wants to see Dr. Molina. Last breast surgery 2010 Dr. Shahid.  Original implants around age 32 with CA dx.   #2-Left shoulder pain: Chronic issue without previous orthopedic work-up.  Tells me that she fell over 10 years ago and \"hurt left shoulder.\"  Has tried a chiropractor / yoga without relief.  Ready to see orthopedics.  Has left shoulder pain with most activities of daily living.  Denies left arm numbness or tingling.  Has never had an x-ray of her left shoulder that she is aware of.  #3- due for screening colonoscopy. Denies GI issues.  Last colonoscopy 2012.    #4-Sleep disturbance: Chronically has trouble falling and staying asleep.  Currently using Ambien as needed but this stresses her out in terms of knowledge that Ambien could cause a memory deficit.  Has a family history of dementia.  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  Tried Wellbutrin for depression which she could not tolerate.    Exam:  BP (!) 90/56 (BP Location: Right arm, Patient Position: Sitting, BP Cuff Size: Adult)   Pulse 60   Temp 36.6 °C (97.9 °F)   Resp 12   Ht 1.702 m (5' 7\")   Wt 68.5 kg (151 lb)   SpO2 98%   Gen. appears healthy in no distress   Skin appropriate for sex and age   Neck trachea is midline  Lungs unlabored breathing  Heart regular rate  Chest:  Right breast notably more firm / asymmetrical compared to right with observation.  Left shoulder exam: No deformity or erythema.  No bony tenderness.  Experiences discomfort with extension of the arm beyond about 60 to 70 degrees.  Pain is worse with arm extended and thumb pointed up.  She is able to reach behind her back with what she " describes as minimal pain.   2 out of 2 bilaterally.  Neuro gait and station normal   Psych appropriate, calm, interactive, well-groomed    A/P:  1. Capsular contracture of breast implant, initial encounter  Referral to Plastic Surgery   2. Chronic left shoulder pain  Referral to Orthopedics   3. Special screening for malignant neoplasm of colon  Referral to GI for Colonoscopy   4. Primary insomnia  traZODone (DESYREL) 50 MG Tab   referred to plastics in regards to right breast asymmetry.    Referred to orthopedics for left shoulder work-up.  Referred to gastroenterology for screening colonoscopy..  Reviewed colonoscopy from 2012.  In terms of her sleep and Ambien concerns, I do agree that she would benefit from trying a medication less risky in regards to memory.  Trial of trazodone 25 to 50 mg, 45 minutes prior to bedtime and allowing at least 8 hours for sleep..  Discussed that we can certainly increase her dose of trazodone if it is insufficient in alleviating her difficulty falling/staying asleep.    She will let me know how things are going over the next few weeks with referrals and sleep.

## 2022-08-29 RX ORDER — TOBRAMYCIN 3 MG/ML
1 SOLUTION/ DROPS OPHTHALMIC EVERY 4 HOURS
Qty: 5 ML | Refills: 0 | Status: SHIPPED | OUTPATIENT
Start: 2022-08-29 | End: 2023-08-07

## 2022-09-26 DIAGNOSIS — B00.9 HSV INFECTION: ICD-10-CM

## 2022-09-26 DIAGNOSIS — F51.01 PRIMARY INSOMNIA: ICD-10-CM

## 2022-09-26 RX ORDER — TRAZODONE HYDROCHLORIDE 50 MG/1
50 TABLET ORAL NIGHTLY
Qty: 30 TABLET | Refills: 3 | Status: SHIPPED | OUTPATIENT
Start: 2022-09-26 | End: 2023-08-07

## 2022-09-26 RX ORDER — ACYCLOVIR 400 MG/1
400 TABLET ORAL 3 TIMES DAILY
Qty: 90 TABLET | Refills: 0 | Status: SHIPPED | OUTPATIENT
Start: 2022-09-26 | End: 2022-09-26 | Stop reason: SDUPTHER

## 2022-09-26 RX ORDER — ACYCLOVIR 400 MG/1
400 TABLET ORAL 3 TIMES DAILY
Qty: 90 TABLET | Refills: 0 | Status: SHIPPED | OUTPATIENT
Start: 2022-09-26 | End: 2023-04-06 | Stop reason: SDUPTHER

## 2022-09-26 RX ORDER — TRAZODONE HYDROCHLORIDE 50 MG/1
50 TABLET ORAL NIGHTLY
Qty: 30 TABLET | Refills: 3 | Status: SHIPPED | OUTPATIENT
Start: 2022-09-26 | End: 2022-09-26 | Stop reason: SDUPTHER

## 2022-09-26 NOTE — TELEPHONE ENCOUNTER
Received request via: Pharmacy    Was the patient seen in the last year in this department? Yes  8/8/22  Does the patient have an active prescription (recently filled or refills available) for medication(s) requested? No

## 2022-12-02 NOTE — TELEPHONE ENCOUNTER
----- Message from HOMAR Ferreira sent at 3/2/2017  6:31 PM PST -----  Regarding: FW: Prescription Question  Contact: [phone]  Approved her ambien from home - sorry!  Can you print this rx or call it into her mail order.   Thank you!  ----- Message -----     From: Naomi Durham, Gabe Ass't     Sent: 3/2/2017   7:59 AM       To: HOMAR Ferreira  Subject: FW: Prescription Question                            ----- Message -----     From: Celia Shaffer     Sent: 3/1/2017   7:40 PM       To: Daniel Block Ma  Subject: RE: Prescription Question                        That would be great, Shantel jones &#[id number];  ----- Message -----  From: HOMAR Ferreira  Sent: 3/1/2017  8:43 AM PST  To: Celia Shaffer  Subject: RE: Prescription Question    I can send 30 to your mail order but unfortunately we can't do 90 d prescriptions for controlled substances . Would that help?    ----- Message -----     From: CELIA SHAFFER     Sent: 2/28/2017  6:00 PM PST       To: HOMAR Ferreira  Subject: Prescription Question    Sree Funes.    Thanks for suggesting the Ambien - it has helped on the nights that I can't fall asleep within an hour.  Question, I have not refilled this, but am wondering if this is one we can send to [facility]?   (I would rather do that than pay a co-pay for a quantity of 15.)    Many thanks,  Celia     Rand Russell Rand Russell

## 2023-04-06 ENCOUNTER — PATIENT MESSAGE (OUTPATIENT)
Dept: MEDICAL GROUP | Facility: LAB | Age: 63
End: 2023-04-06
Payer: COMMERCIAL

## 2023-04-06 DIAGNOSIS — B00.9 HSV INFECTION: ICD-10-CM

## 2023-04-06 DIAGNOSIS — F51.01 PRIMARY INSOMNIA: ICD-10-CM

## 2023-04-06 RX ORDER — ZOLPIDEM TARTRATE 5 MG/1
5 TABLET ORAL
Qty: 30 TABLET | Refills: 0 | Status: SHIPPED | OUTPATIENT
Start: 2023-04-06 | End: 2023-04-06 | Stop reason: SDUPTHER

## 2023-04-06 RX ORDER — ACYCLOVIR 400 MG/1
400 TABLET ORAL 3 TIMES DAILY
Qty: 90 TABLET | Refills: 0 | Status: SHIPPED | OUTPATIENT
Start: 2023-04-06 | End: 2023-08-10 | Stop reason: SDUPTHER

## 2023-04-06 RX ORDER — ZOLPIDEM TARTRATE 5 MG/1
5 TABLET ORAL
Qty: 30 TABLET | Refills: 0 | Status: SHIPPED | OUTPATIENT
Start: 2023-04-06 | End: 2023-08-07 | Stop reason: SDUPTHER

## 2023-04-06 NOTE — TELEPHONE ENCOUNTER
Received request via: Pharmacy    Was the patient seen in the last year in this department? Yes  8/8/22  Does the patient have an active prescription (recently filled or refills available) for medication(s) requested? No    Does the patient have [facility] and need 100 day supply (blood pressure, diabetes and cholesterol meds only)? Medication is not for cholesterol, blood pressure or diabetes

## 2023-07-20 ENCOUNTER — PATIENT MESSAGE (OUTPATIENT)
Dept: MEDICAL GROUP | Facility: LAB | Age: 63
End: 2023-07-20
Payer: COMMERCIAL

## 2023-07-20 DIAGNOSIS — Z00.00 PREVENTATIVE HEALTH CARE: ICD-10-CM

## 2023-07-27 ENCOUNTER — HOSPITAL ENCOUNTER (OUTPATIENT)
Dept: LAB | Facility: MEDICAL CENTER | Age: 63
End: 2023-07-27
Attending: NURSE PRACTITIONER
Payer: COMMERCIAL

## 2023-07-27 DIAGNOSIS — Z00.00 PREVENTATIVE HEALTH CARE: ICD-10-CM

## 2023-07-27 LAB
25(OH)D3 SERPL-MCNC: 51 NG/ML (ref 30–100)
ALBUMIN SERPL BCP-MCNC: 4.4 G/DL (ref 3.2–4.9)
ALBUMIN/GLOB SERPL: 1.4 G/DL
ALP SERPL-CCNC: 87 U/L (ref 30–99)
ALT SERPL-CCNC: 51 U/L (ref 2–50)
ANION GAP SERPL CALC-SCNC: 10 MMOL/L (ref 7–16)
AST SERPL-CCNC: 43 U/L (ref 12–45)
BASOPHILS # BLD AUTO: 1.8 % (ref 0–1.8)
BASOPHILS # BLD: 0.08 K/UL (ref 0–0.12)
BILIRUB SERPL-MCNC: 0.3 MG/DL (ref 0.1–1.5)
BUN SERPL-MCNC: 15 MG/DL (ref 8–22)
CALCIUM ALBUM COR SERPL-MCNC: 9.5 MG/DL (ref 8.5–10.5)
CALCIUM SERPL-MCNC: 9.8 MG/DL (ref 8.5–10.5)
CHLORIDE SERPL-SCNC: 104 MMOL/L (ref 96–112)
CHOLEST SERPL-MCNC: 219 MG/DL (ref 100–199)
CO2 SERPL-SCNC: 25 MMOL/L (ref 20–33)
CREAT SERPL-MCNC: 0.83 MG/DL (ref 0.5–1.4)
EOSINOPHIL # BLD AUTO: 0.28 K/UL (ref 0–0.51)
EOSINOPHIL NFR BLD: 6.5 % (ref 0–6.9)
ERYTHROCYTE [DISTWIDTH] IN BLOOD BY AUTOMATED COUNT: 50 FL (ref 35.9–50)
EST. AVERAGE GLUCOSE BLD GHB EST-MCNC: 111 MG/DL
FASTING STATUS PATIENT QL REPORTED: NORMAL
GFR SERPLBLD CREATININE-BSD FMLA CKD-EPI: 80 ML/MIN/1.73 M 2
GLOBULIN SER CALC-MCNC: 3.1 G/DL (ref 1.9–3.5)
GLUCOSE SERPL-MCNC: 73 MG/DL (ref 65–99)
HBA1C MFR BLD: 5.5 % (ref 4–5.6)
HCT VFR BLD AUTO: 37 % (ref 37–47)
HDLC SERPL-MCNC: 61 MG/DL
HGB BLD-MCNC: 12.4 G/DL (ref 12–16)
IMM GRANULOCYTES # BLD AUTO: 0.01 K/UL (ref 0–0.11)
IMM GRANULOCYTES NFR BLD AUTO: 0.2 % (ref 0–0.9)
LDLC SERPL CALC-MCNC: 143 MG/DL
LYMPHOCYTES # BLD AUTO: 2.02 K/UL (ref 1–4.8)
LYMPHOCYTES NFR BLD: 46.7 % (ref 22–41)
MCH RBC QN AUTO: 34.1 PG (ref 27–33)
MCHC RBC AUTO-ENTMCNC: 33.5 G/DL (ref 32.2–35.5)
MCV RBC AUTO: 101.6 FL (ref 81.4–97.8)
MONOCYTES # BLD AUTO: 0.41 K/UL (ref 0–0.85)
MONOCYTES NFR BLD AUTO: 9.5 % (ref 0–13.4)
NEUTROPHILS # BLD AUTO: 1.53 K/UL (ref 1.82–7.42)
NEUTROPHILS NFR BLD: 35.3 % (ref 44–72)
NRBC # BLD AUTO: 0 K/UL
NRBC BLD-RTO: 0 /100 WBC (ref 0–0.2)
PLATELET # BLD AUTO: 217 K/UL (ref 164–446)
PMV BLD AUTO: 9.9 FL (ref 9–12.9)
POTASSIUM SERPL-SCNC: 5.1 MMOL/L (ref 3.6–5.5)
PROT SERPL-MCNC: 7.5 G/DL (ref 6–8.2)
RBC # BLD AUTO: 3.64 M/UL (ref 4.2–5.4)
SODIUM SERPL-SCNC: 139 MMOL/L (ref 135–145)
T4 FREE SERPL-MCNC: 1.3 NG/DL (ref 0.93–1.7)
TRIGL SERPL-MCNC: 74 MG/DL (ref 0–149)
TSH SERPL DL<=0.005 MIU/L-ACNC: 5.09 UIU/ML (ref 0.38–5.33)
WBC # BLD AUTO: 4.3 K/UL (ref 4.8–10.8)

## 2023-07-27 PROCEDURE — 82306 VITAMIN D 25 HYDROXY: CPT

## 2023-07-27 PROCEDURE — 80053 COMPREHEN METABOLIC PANEL: CPT

## 2023-07-27 PROCEDURE — 80061 LIPID PANEL: CPT

## 2023-07-27 PROCEDURE — 84439 ASSAY OF FREE THYROXINE: CPT

## 2023-07-27 PROCEDURE — 83036 HEMOGLOBIN GLYCOSYLATED A1C: CPT

## 2023-07-27 PROCEDURE — 36415 COLL VENOUS BLD VENIPUNCTURE: CPT

## 2023-07-27 PROCEDURE — 84443 ASSAY THYROID STIM HORMONE: CPT

## 2023-07-27 PROCEDURE — 85025 COMPLETE CBC W/AUTO DIFF WBC: CPT

## 2023-08-06 SDOH — ECONOMIC STABILITY: INCOME INSECURITY: HOW HARD IS IT FOR YOU TO PAY FOR THE VERY BASICS LIKE FOOD, HOUSING, MEDICAL CARE, AND HEATING?: NOT VERY HARD

## 2023-08-06 SDOH — HEALTH STABILITY: MENTAL HEALTH
STRESS IS WHEN SOMEONE FEELS TENSE, NERVOUS, ANXIOUS, OR CAN'T SLEEP AT NIGHT BECAUSE THEIR MIND IS TROUBLED. HOW STRESSED ARE YOU?: TO SOME EXTENT

## 2023-08-06 SDOH — ECONOMIC STABILITY: FOOD INSECURITY: WITHIN THE PAST 12 MONTHS, YOU WORRIED THAT YOUR FOOD WOULD RUN OUT BEFORE YOU GOT MONEY TO BUY MORE.: NEVER TRUE

## 2023-08-06 SDOH — ECONOMIC STABILITY: TRANSPORTATION INSECURITY
IN THE PAST 12 MONTHS, HAS THE LACK OF TRANSPORTATION KEPT YOU FROM MEDICAL APPOINTMENTS OR FROM GETTING MEDICATIONS?: NO

## 2023-08-06 SDOH — ECONOMIC STABILITY: HOUSING INSECURITY
IN THE LAST 12 MONTHS, WAS THERE A TIME WHEN YOU DID NOT HAVE A STEADY PLACE TO SLEEP OR SLEPT IN A SHELTER (INCLUDING NOW)?: NO

## 2023-08-06 SDOH — ECONOMIC STABILITY: TRANSPORTATION INSECURITY
IN THE PAST 12 MONTHS, HAS LACK OF TRANSPORTATION KEPT YOU FROM MEETINGS, WORK, OR FROM GETTING THINGS NEEDED FOR DAILY LIVING?: NO

## 2023-08-06 SDOH — HEALTH STABILITY: PHYSICAL HEALTH: ON AVERAGE, HOW MANY DAYS PER WEEK DO YOU ENGAGE IN MODERATE TO STRENUOUS EXERCISE (LIKE A BRISK WALK)?: 6 DAYS

## 2023-08-06 SDOH — ECONOMIC STABILITY: INCOME INSECURITY: IN THE LAST 12 MONTHS, WAS THERE A TIME WHEN YOU WERE NOT ABLE TO PAY THE MORTGAGE OR RENT ON TIME?: NO

## 2023-08-06 SDOH — ECONOMIC STABILITY: TRANSPORTATION INSECURITY
IN THE PAST 12 MONTHS, HAS LACK OF RELIABLE TRANSPORTATION KEPT YOU FROM MEDICAL APPOINTMENTS, MEETINGS, WORK OR FROM GETTING THINGS NEEDED FOR DAILY LIVING?: NO

## 2023-08-06 SDOH — ECONOMIC STABILITY: FOOD INSECURITY: WITHIN THE PAST 12 MONTHS, THE FOOD YOU BOUGHT JUST DIDN'T LAST AND YOU DIDN'T HAVE MONEY TO GET MORE.: NEVER TRUE

## 2023-08-06 SDOH — ECONOMIC STABILITY: HOUSING INSECURITY: IN THE LAST 12 MONTHS, HOW MANY PLACES HAVE YOU LIVED?: 1

## 2023-08-06 SDOH — HEALTH STABILITY: PHYSICAL HEALTH: ON AVERAGE, HOW MANY MINUTES DO YOU ENGAGE IN EXERCISE AT THIS LEVEL?: 60 MIN

## 2023-08-06 ASSESSMENT — SOCIAL DETERMINANTS OF HEALTH (SDOH)
HOW OFTEN DO YOU GET TOGETHER WITH FRIENDS OR RELATIVES?: TWICE A WEEK
HOW OFTEN DO YOU HAVE SIX OR MORE DRINKS ON ONE OCCASION: NEVER
DO YOU BELONG TO ANY CLUBS OR ORGANIZATIONS SUCH AS CHURCH GROUPS UNIONS, FRATERNAL OR ATHLETIC GROUPS, OR SCHOOL GROUPS?: YES
WITHIN THE PAST 12 MONTHS, YOU WORRIED THAT YOUR FOOD WOULD RUN OUT BEFORE YOU GOT THE MONEY TO BUY MORE: NEVER TRUE
HOW HARD IS IT FOR YOU TO PAY FOR THE VERY BASICS LIKE FOOD, HOUSING, MEDICAL CARE, AND HEATING?: NOT VERY HARD
HOW OFTEN DO YOU GET TOGETHER WITH FRIENDS OR RELATIVES?: TWICE A WEEK
IN A TYPICAL WEEK, HOW MANY TIMES DO YOU TALK ON THE PHONE WITH FAMILY, FRIENDS, OR NEIGHBORS?: MORE THAN THREE TIMES A WEEK
HOW OFTEN DO YOU ATTENT MEETINGS OF THE CLUB OR ORGANIZATION YOU BELONG TO?: MORE THAN 4 TIMES PER YEAR
IN A TYPICAL WEEK, HOW MANY TIMES DO YOU TALK ON THE PHONE WITH FAMILY, FRIENDS, OR NEIGHBORS?: MORE THAN THREE TIMES A WEEK
HOW OFTEN DO YOU HAVE A DRINK CONTAINING ALCOHOL: NEVER
HOW OFTEN DO YOU ATTENT MEETINGS OF THE CLUB OR ORGANIZATION YOU BELONG TO?: MORE THAN 4 TIMES PER YEAR
HOW MANY DRINKS CONTAINING ALCOHOL DO YOU HAVE ON A TYPICAL DAY WHEN YOU ARE DRINKING: PATIENT DOES NOT DRINK
HOW OFTEN DO YOU ATTEND CHURCH OR RELIGIOUS SERVICES?: 1 TO 4 TIMES PER YEAR
HOW OFTEN DO YOU ATTEND CHURCH OR RELIGIOUS SERVICES?: 1 TO 4 TIMES PER YEAR
DO YOU BELONG TO ANY CLUBS OR ORGANIZATIONS SUCH AS CHURCH GROUPS UNIONS, FRATERNAL OR ATHLETIC GROUPS, OR SCHOOL GROUPS?: YES

## 2023-08-06 ASSESSMENT — LIFESTYLE VARIABLES
HOW OFTEN DO YOU HAVE A DRINK CONTAINING ALCOHOL: NEVER
AUDIT-C TOTAL SCORE: 0
SKIP TO QUESTIONS 9-10: 1
HOW OFTEN DO YOU HAVE SIX OR MORE DRINKS ON ONE OCCASION: NEVER
HOW MANY STANDARD DRINKS CONTAINING ALCOHOL DO YOU HAVE ON A TYPICAL DAY: PATIENT DOES NOT DRINK

## 2023-08-07 ENCOUNTER — OFFICE VISIT (OUTPATIENT)
Dept: MEDICAL GROUP | Facility: LAB | Age: 63
End: 2023-08-07
Payer: COMMERCIAL

## 2023-08-07 VITALS
HEIGHT: 67 IN | RESPIRATION RATE: 16 BRPM | WEIGHT: 157 LBS | HEART RATE: 77 BPM | DIASTOLIC BLOOD PRESSURE: 60 MMHG | TEMPERATURE: 96.9 F | OXYGEN SATURATION: 97 % | BODY MASS INDEX: 24.64 KG/M2 | SYSTOLIC BLOOD PRESSURE: 104 MMHG

## 2023-08-07 DIAGNOSIS — Z00.00 WELL ADULT EXAM: ICD-10-CM

## 2023-08-07 DIAGNOSIS — M54.50 CHRONIC BILATERAL LOW BACK PAIN WITHOUT SCIATICA: ICD-10-CM

## 2023-08-07 DIAGNOSIS — G89.29 CHRONIC NECK PAIN: ICD-10-CM

## 2023-08-07 DIAGNOSIS — G89.29 CHRONIC BILATERAL LOW BACK PAIN WITHOUT SCIATICA: ICD-10-CM

## 2023-08-07 DIAGNOSIS — F51.01 PRIMARY INSOMNIA: ICD-10-CM

## 2023-08-07 DIAGNOSIS — M54.2 CHRONIC NECK PAIN: ICD-10-CM

## 2023-08-07 DIAGNOSIS — Z13.820 SCREENING FOR OSTEOPOROSIS: ICD-10-CM

## 2023-08-07 PROCEDURE — 3074F SYST BP LT 130 MM HG: CPT | Performed by: NURSE PRACTITIONER

## 2023-08-07 PROCEDURE — 3078F DIAST BP <80 MM HG: CPT | Performed by: NURSE PRACTITIONER

## 2023-08-07 PROCEDURE — 99396 PREV VISIT EST AGE 40-64: CPT | Performed by: NURSE PRACTITIONER

## 2023-08-07 RX ORDER — ZOLPIDEM TARTRATE 5 MG/1
5 TABLET ORAL
Qty: 30 TABLET | Refills: 2 | Status: SHIPPED | OUTPATIENT
Start: 2023-08-07 | End: 2023-09-06

## 2023-08-07 ASSESSMENT — FIBROSIS 4 INDEX: FIB4 SCORE: 1.72

## 2023-08-07 ASSESSMENT — PATIENT HEALTH QUESTIONNAIRE - PHQ9: CLINICAL INTERPRETATION OF PHQ2 SCORE: 0
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
## 2023-08-10 ENCOUNTER — HOSPITAL ENCOUNTER (OUTPATIENT)
Dept: RADIOLOGY | Facility: MEDICAL CENTER | Age: 63
End: 2023-08-10
Attending: NURSE PRACTITIONER
Payer: COMMERCIAL

## 2023-08-10 DIAGNOSIS — B00.9 HSV INFECTION: ICD-10-CM

## 2023-08-10 DIAGNOSIS — G89.29 CHRONIC NECK PAIN: ICD-10-CM

## 2023-08-10 DIAGNOSIS — M54.2 CHRONIC NECK PAIN: ICD-10-CM

## 2023-08-10 DIAGNOSIS — G89.29 CHRONIC BILATERAL LOW BACK PAIN WITHOUT SCIATICA: ICD-10-CM

## 2023-08-10 DIAGNOSIS — M54.50 CHRONIC BILATERAL LOW BACK PAIN WITHOUT SCIATICA: ICD-10-CM

## 2023-08-10 PROCEDURE — 72100 X-RAY EXAM L-S SPINE 2/3 VWS: CPT

## 2023-08-10 PROCEDURE — 72040 X-RAY EXAM NECK SPINE 2-3 VW: CPT

## 2023-08-10 RX ORDER — ACYCLOVIR 400 MG/1
400 TABLET ORAL 3 TIMES DAILY
Qty: 90 TABLET | Refills: 0 | Status: SHIPPED | OUTPATIENT
Start: 2023-08-10 | End: 2023-11-19

## 2023-08-10 NOTE — TELEPHONE ENCOUNTER
Received request via: Patient    Was the patient seen in the last year in this department? Yes  LOV: 8/7/2023  Does the patient have an active prescription (recently filled or refills available) for medication(s) requested? No    Does the patient have [facility] and need 100 day supply (blood pressure, diabetes and cholesterol meds only)? Patient does not have SCP

## 2023-09-28 ENCOUNTER — HOSPITAL ENCOUNTER (OUTPATIENT)
Dept: RADIOLOGY | Facility: MEDICAL CENTER | Age: 63
End: 2023-09-28
Attending: NURSE PRACTITIONER
Payer: COMMERCIAL

## 2023-09-28 DIAGNOSIS — Z13.820 SCREENING FOR OSTEOPOROSIS: ICD-10-CM

## 2023-09-28 PROCEDURE — 77080 DXA BONE DENSITY AXIAL: CPT

## 2023-11-17 DIAGNOSIS — B00.9 HSV INFECTION: ICD-10-CM

## 2023-11-19 RX ORDER — ACYCLOVIR 400 MG/1
400 TABLET ORAL 3 TIMES DAILY
Qty: 90 TABLET | Refills: 0 | Status: SHIPPED | OUTPATIENT
Start: 2023-11-19 | End: 2024-02-05

## 2024-02-03 DIAGNOSIS — B00.9 HSV INFECTION: ICD-10-CM

## 2024-02-03 DIAGNOSIS — G47.9 SLEEP DISTURBANCE: ICD-10-CM

## 2024-02-05 RX ORDER — ZOLPIDEM TARTRATE 5 MG/1
5 TABLET ORAL NIGHTLY PRN
COMMUNITY
Start: 2024-02-03 | End: 2024-02-05 | Stop reason: SDUPTHER

## 2024-02-05 RX ORDER — ZOLPIDEM TARTRATE 5 MG/1
5 TABLET ORAL NIGHTLY PRN
Qty: 30 TABLET | Refills: 2 | Status: SHIPPED | OUTPATIENT
Start: 2024-02-05 | End: 2024-03-06

## 2024-02-05 RX ORDER — ACYCLOVIR 400 MG/1
400 TABLET ORAL 3 TIMES DAILY
Qty: 90 TABLET | Refills: 0 | Status: SHIPPED | OUTPATIENT
Start: 2024-02-05

## 2024-02-05 NOTE — TELEPHONE ENCOUNTER
Received request via: Pharmacy    Was the patient seen in the last year in this department? Yes    Does the patient have an active prescription (recently filled or refills available) for medication(s) requested? No    Pharmacy Name: [facility]    Does the patient have [facility] Plus and need 100 day supply (blood pressure, diabetes and cholesterol meds only)? Patient does not have SCP

## 2024-02-21 ENCOUNTER — APPOINTMENT (OUTPATIENT)
Dept: RADIOLOGY | Facility: IMAGING CENTER | Age: 64
End: 2024-02-21
Payer: COMMERCIAL

## 2024-02-21 ENCOUNTER — OFFICE VISIT (OUTPATIENT)
Dept: URGENT CARE | Facility: CLINIC | Age: 64
End: 2024-02-21
Payer: COMMERCIAL

## 2024-02-21 VITALS
DIASTOLIC BLOOD PRESSURE: 52 MMHG | BODY MASS INDEX: 24.33 KG/M2 | RESPIRATION RATE: 12 BRPM | TEMPERATURE: 97.9 F | WEIGHT: 155 LBS | HEART RATE: 86 BPM | OXYGEN SATURATION: 95 % | SYSTOLIC BLOOD PRESSURE: 120 MMHG | HEIGHT: 67 IN

## 2024-02-21 DIAGNOSIS — S22.000A COMPRESSION FRACTURE OF THORACIC VERTEBRA, UNSPECIFIED THORACIC VERTEBRAL LEVEL, INITIAL ENCOUNTER (HCC): ICD-10-CM

## 2024-02-21 DIAGNOSIS — J10.1 INFLUENZA A: ICD-10-CM

## 2024-02-21 DIAGNOSIS — J06.9 VIRAL URI WITH COUGH: ICD-10-CM

## 2024-02-21 LAB
FLUAV RNA SPEC QL NAA+PROBE: POSITIVE
FLUBV RNA SPEC QL NAA+PROBE: NEGATIVE
RSV RNA SPEC QL NAA+PROBE: NEGATIVE
SARS-COV-2 RNA RESP QL NAA+PROBE: NEGATIVE

## 2024-02-21 PROCEDURE — 99214 OFFICE O/P EST MOD 30 MIN: CPT

## 2024-02-21 PROCEDURE — 71046 X-RAY EXAM CHEST 2 VIEWS: CPT | Mod: TC | Performed by: RADIOLOGY

## 2024-02-21 PROCEDURE — 3074F SYST BP LT 130 MM HG: CPT

## 2024-02-21 PROCEDURE — 0241U POCT CEPHEID COV-2, FLU A/B, RSV - PCR: CPT

## 2024-02-21 PROCEDURE — 3078F DIAST BP <80 MM HG: CPT

## 2024-02-21 RX ORDER — BENZONATATE 100 MG/1
100 CAPSULE ORAL 3 TIMES DAILY PRN
Qty: 60 CAPSULE | Refills: 0 | Status: SHIPPED | OUTPATIENT
Start: 2024-02-21 | End: 2024-03-11

## 2024-02-21 RX ORDER — OXYCODONE HYDROCHLORIDE AND ACETAMINOPHEN 5; 325 MG/1; MG/1
1 TABLET ORAL EVERY 4 HOURS PRN
Qty: 12 TABLET | Refills: 0 | Status: SHIPPED | OUTPATIENT
Start: 2024-02-21 | End: 2024-02-23

## 2024-02-21 RX ORDER — ALBUTEROL SULFATE 90 UG/1
2 AEROSOL, METERED RESPIRATORY (INHALATION) EVERY 6 HOURS PRN
Qty: 8.5 G | Refills: 0 | Status: SHIPPED | OUTPATIENT
Start: 2024-02-21

## 2024-02-21 ASSESSMENT — ENCOUNTER SYMPTOMS
FALLS: 0
SPUTUM PRODUCTION: 1
BACK PAIN: 1
MYALGIAS: 1
SORE THROAT: 1
FEVER: 1
COUGH: 1

## 2024-02-21 ASSESSMENT — FIBROSIS 4 INDEX: FIB4 SCORE: 1.72

## 2024-02-21 NOTE — PROGRESS NOTES
Subjective:     CHIEF COMPLAINT  Chief Complaint   Patient presents with    Congestion     Recovering from flu / congestion        HPI  Celia Shaffer is a very pleasant 63 y.o. female who presents with a cough, phlegm production, a rattling sensation in her chest, and fatigue for the past 5 days.  She reports that her symptoms started Saturday with a fever of 101.5.  Her  and grandson have been sick with similar symptoms as well.  She reports that her cough has been productive and over the past few days she has started to feel a rattling sensation in her chest.  She has been experiencing body aches and chills.  She reports that her fever seems to have resolved today.  She reports that she hurt her back several days ago lifting heavy boxes and has had severe pain in her back with coughing.  She originally tried treating her symptoms with naproxen, but reports that this seems to have inflamed a previous gastric ulcer.  She has discontinued the naproxen and has been taking Prilosec and Tylenol without adequate relief of back pain.  She reports she has had extreme difficulty getting comfortable.  She denies any tenderness to palpation to her back.    REVIEW OF SYSTEMS  Review of Systems   Constitutional:  Positive for fever (Resolved) and malaise/fatigue.   HENT:  Positive for congestion (Runny nose) and sore throat (Mild, worse at night).    Respiratory:  Positive for cough and sputum production.    Musculoskeletal:  Positive for back pain and myalgias. Negative for falls.       PAST MEDICAL HISTORY  Patient Active Problem List    Diagnosis Date Noted    Malignant neoplasm of breast (HCC) 02/03/2022    Depression 02/03/2022    PUD (peptic ulcer disease) 02/03/2022    Chronic neck pain 02/03/2022    Hypothyroidism 02/03/2022    Low back pain with sciatica 09/27/2016    Abnormal TSH 03/10/2016    History of myocardial infarction 02/09/2015    HSV infection 07/14/2014    Macrocytic anemia 08/22/2012     Dyslipidemia - neg CT cardiac scoring 2011    Coronary artery disease 2010       SURGICAL HISTORY   has a past surgical history that includes mastectomy bilateral subq (); breast reconstruction (); other (); breast reconstruction (2010); breast implant removal (2010); capsulectomy (2010); tissue expander place/remove (2010); breast reconstruction (10/21/2010); tissue expander place/remove (10/21/2010); breast implant revision (10/21/2010); breast reconstruction (2011); liposuction (2011); tonsillectomy (2012); turbinate reduction (2012); uvulopharyngopalatoplasty (2012); septoplasty (2012); other; and breast implant revision (Bilateral).    ALLERGIES  Allergies   Allergen Reactions    Nkda [No Known Drug Allergy]        CURRENT MEDICATIONS  Home Medications       Reviewed by Dulce Dorman P.A.-C. (Physician Assistant) on 24 at 1054  Med List Status: <None>     Medication Last Dose Status   acyclovir (ZOVIRAX) 400 MG tablet PRN Active   acyclovir (ZOVIRAX) 5 % Ointment PRN Active   Cholecalciferol (VITAMIN D PO) Taking Active   multivitamin (THERAGRAN) TABS Taking Active   Omega-3 Fatty Acids (FISH OIL) 1000 MG Cap capsule Taking Active   vitamin E (VITAMIN E) 1000 UNIT Cap  Active   zolpidem (AMBIEN) 5 MG Tab Taking Active                    SOCIAL HISTORY  Social History     Tobacco Use    Smoking status: Former     Current packs/day: 0.00     Average packs/day: 0.5 packs/day for 10.0 years (5.0 ttl pk-yrs)     Types: Cigarettes     Start date: 1981     Quit date: 1991     Years since quittin.1    Smokeless tobacco: Never   Vaping Use    Vaping Use: Never used   Substance and Sexual Activity    Alcohol use: Yes     Comment: 2 per day    Drug use: No    Sexual activity: Not on file     Comment: , 1 child . working for the [employer]       FAMILY HISTORY  Family History   Problem Relation Age of Onset     "Hypertension Mother     Arthritis Mother     Stroke Mother     Lung Disease Father         pulmonary fibrosis          Objective:     VITAL SIGNS: /52 (BP Location: Left arm, Patient Position: Sitting, BP Cuff Size: Adult)   Pulse 86   Temp 36.6 °C (97.9 °F) (Temporal)   Resp 12   Ht 1.702 m (5' 7\")   Wt 70.3 kg (155 lb)   SpO2 95%   BMI 24.28 kg/m²     PHYSICAL EXAM  Physical Exam  Vitals reviewed.   Constitutional:       General: She is not in acute distress.     Appearance: Normal appearance. She is ill-appearing. She is not toxic-appearing or diaphoretic.   HENT:      Head: Normocephalic and atraumatic.      Nose: Rhinorrhea present.      Mouth/Throat:      Mouth: Mucous membranes are moist.      Pharynx: Posterior oropharyngeal erythema present. No oropharyngeal exudate.      Comments: Posterior oropharyngeal erythema  Eyes:      Conjunctiva/sclera: Conjunctivae normal.   Cardiovascular:      Rate and Rhythm: Normal rate and regular rhythm.      Heart sounds: Normal heart sounds.   Pulmonary:      Effort: Pulmonary effort is normal. No respiratory distress.      Breath sounds: No stridor. Rhonchi (Upper and lower lung fields bilaterally) present. No wheezing or rales.   Musculoskeletal:        Back:       Comments: Back pain without tenderness to palpation   Lymphadenopathy:      Cervical: Cervical adenopathy present.   Skin:     General: Skin is warm and dry.      Coloration: Skin is pale.   Neurological:      General: No focal deficit present.      Mental Status: She is alert.   Psychiatric:         Mood and Affect: Mood normal.         Assessment/Plan:     1. Influenza A  - POCT CoV-2, Flu A/B, RSV by PCR  - DX-CHEST-2 VIEWS; Future  - benzonatate (TESSALON) 100 MG Cap; Take 1 Capsule by mouth 3 times a day as needed for Cough.  Dispense: 60 Capsule; Refill: 0  - albuterol 108 (90 Base) MCG/ACT Aero Soln inhalation aerosol; Inhale 2 Puffs every 6 hours as needed for Shortness of Breath.  " Dispense: 8.5 g; Refill: 0    2. Compression fracture of thoracic vertebra, unspecified thoracic vertebral level, initial encounter (HCC)  - oxyCODONE-acetaminophen (PERCOCET) 5-325 MG Tab; Take 1 Tablet by mouth every four hours as needed for Severe Pain for up to 2 days.  Dispense: 12 Tablet; Refill: 0    Other orders  - Consent for Opiate Prescription  -Rest and hydrate  -Return to clinic if symptoms worsen or fail to resolve  -Follow up with PCP regarding vertebral compression     Results:  DX-CHEST-2 VIEWS  Narrative: 2/21/2024 11:03 AM    HISTORY/REASON FOR EXAM:  Cough; Rhonchi, cough    TECHNIQUE/EXAM DESCRIPTION AND NUMBER OF VIEWS:  Two views of the chest.    COMPARISON:  8/2/2021    FINDINGS:  Cardiomediastinal contour is within normal limits.  No focal pulmonary consolidation.  No pleural fluid collection or pneumothorax.  Degenerative change of thoracic spine.  Moderate severe compression of mid to upper thoracic vertebral body, more apparent than on prior.  Postoperative change of RIGHT axilla.  Prior RIGHT mastectomy with reconstruction.  Impression: 1.  No acute cardiopulmonary disease.  2.  Bilateral severe compression of mid to upper thoracic vertebral body, more apparent than on prior exam, age indeterminate.      MDM/Comments:  I have prepared for this visit by personally reviewing the patient's prevous medical records, vitals, and labs including: most recent GFR and CMP. Patient has stable vital signs and is non-toxic appearing.  Viral testing performed in office with influenza A results.  Chest x-ray obtained in office showing severe compression of the thoracic vertebra.  There is no evidence of pneumonia at this time.  I personally reviewed x-ray images and agree with radiology's interpretation.  Patient has failed treatment with Tylenol and naproxen.  She reports a history of gastric ulcers and states that the naproxen has flared up a previous ulcer, which she has been managing with Prilosec.   Patient has been provided 48 hours of Percocet.  Discussed risks and benefits of taking this medication and the addictive nature of this medication.  PDMP reviewed with patient listed as low risk for abuse.  Patient has consented to use of an opiate medication.  Patient will follow-up with her primary care provider in the next several days regarding the vertebral compression found on x-ray.  Patient provided benzonatate and an albuterol inhaler for cough.  Discussed supportive care with hydration, rest, Tylenol as needed. Patient demonstrated understanding of treatment plan at this time and will RTC if symptoms worsen or fail to resolve.     Total time spend caring for patient exceeded 30 minutes. This includes face-to-face time in addition to time spent charting, time spent counseling patient,  and time spent reviewing patient records in preparation for visit.     Differential diagnosis, natural history, supportive care, and indications for immediate follow-up discussed. All questions answered. Patient agrees with the plan of care.    Follow-up as needed if symptoms worsen or fail to improve to PCP, Urgent care or Emergency Room.    I have personally reviewed prior external notes and test results pertinent to today's visit.  I have independently reviewed and interpreted all diagnostics ordered during this urgent care acute visit.   Discussed management options (risks,benefits, and alternatives to treatment). Pt expresses understanding and the treatment plan was agreed upon. Questions were encouraged and answered to pt's satisfaction.    Please note that this dictation was created using voice recognition software. I have made a reasonable attempt to correct obvious errors, but I expect that there are errors of grammar and possibly content that I did not discover before finalizing the note.

## 2024-02-21 NOTE — LETTER
February 21, 2024    To Whom It May Concern:         This is confirmation that Celia Shaffer attended her scheduled appointment with Dulce Dorman P.A.-C. on 2/21/24. Please excuse her absences from work today and tomorrow. She may return to work Monday, 2/26/24.         If you have any questions please do not hesitate to call me at the phone number listed below.    Sincerely,          Dulce Dorman P.A.-C.  [phone]

## 2024-02-24 ENCOUNTER — OFFICE VISIT (OUTPATIENT)
Dept: URGENT CARE | Facility: CLINIC | Age: 64
End: 2024-02-24
Payer: COMMERCIAL

## 2024-02-24 VITALS
OXYGEN SATURATION: 97 % | BODY MASS INDEX: 24.33 KG/M2 | HEIGHT: 67 IN | SYSTOLIC BLOOD PRESSURE: 98 MMHG | WEIGHT: 155 LBS | DIASTOLIC BLOOD PRESSURE: 52 MMHG | RESPIRATION RATE: 16 BRPM | HEART RATE: 100 BPM | TEMPERATURE: 97.7 F

## 2024-02-24 DIAGNOSIS — B37.9 ANTIBIOTIC-INDUCED YEAST INFECTION: ICD-10-CM

## 2024-02-24 DIAGNOSIS — H66.011 NON-RECURRENT ACUTE SUPPURATIVE OTITIS MEDIA OF RIGHT EAR WITH SPONTANEOUS RUPTURE OF TYMPANIC MEMBRANE: ICD-10-CM

## 2024-02-24 DIAGNOSIS — T36.95XA ANTIBIOTIC-INDUCED YEAST INFECTION: ICD-10-CM

## 2024-02-24 DIAGNOSIS — J40 BRONCHITIS: ICD-10-CM

## 2024-02-24 PROCEDURE — 3078F DIAST BP <80 MM HG: CPT | Performed by: STUDENT IN AN ORGANIZED HEALTH CARE EDUCATION/TRAINING PROGRAM

## 2024-02-24 PROCEDURE — 99214 OFFICE O/P EST MOD 30 MIN: CPT | Mod: 25 | Performed by: STUDENT IN AN ORGANIZED HEALTH CARE EDUCATION/TRAINING PROGRAM

## 2024-02-24 PROCEDURE — 3074F SYST BP LT 130 MM HG: CPT | Performed by: STUDENT IN AN ORGANIZED HEALTH CARE EDUCATION/TRAINING PROGRAM

## 2024-02-24 PROCEDURE — 94640 AIRWAY INHALATION TREATMENT: CPT | Performed by: STUDENT IN AN ORGANIZED HEALTH CARE EDUCATION/TRAINING PROGRAM

## 2024-02-24 RX ORDER — FLUTICASONE PROPIONATE 50 MCG
2 SPRAY, SUSPENSION (ML) NASAL
Qty: 16 G | Refills: 1 | Status: SHIPPED | OUTPATIENT
Start: 2024-02-24

## 2024-02-24 RX ORDER — ACETAMINOPHEN 500 MG
1000 TABLET ORAL ONCE
Status: COMPLETED | OUTPATIENT
Start: 2024-02-24 | End: 2024-02-24

## 2024-02-24 RX ORDER — AMOXICILLIN AND CLAVULANATE POTASSIUM 875; 125 MG/1; MG/1
1 TABLET, FILM COATED ORAL 2 TIMES DAILY
Qty: 10 TABLET | Refills: 0 | Status: SHIPPED | OUTPATIENT
Start: 2024-02-24 | End: 2024-02-29

## 2024-02-24 RX ORDER — FLUCONAZOLE 150 MG/1
TABLET ORAL
Qty: 2 TABLET | Refills: 0 | Status: SHIPPED | OUTPATIENT
Start: 2024-02-24

## 2024-02-24 RX ORDER — INHALER, ASSIST DEVICES
1 SPACER (EA) MISCELLANEOUS ONCE
Qty: 1 EACH | Refills: 0 | Status: SHIPPED | OUTPATIENT
Start: 2024-02-24 | End: 2024-02-24

## 2024-02-24 RX ORDER — IPRATROPIUM BROMIDE AND ALBUTEROL SULFATE 2.5; .5 MG/3ML; MG/3ML
3 SOLUTION RESPIRATORY (INHALATION) ONCE
Status: COMPLETED | OUTPATIENT
Start: 2024-02-24 | End: 2024-02-24

## 2024-02-24 RX ADMIN — IPRATROPIUM BROMIDE AND ALBUTEROL SULFATE 3 ML: 2.5; .5 SOLUTION RESPIRATORY (INHALATION) at 15:38

## 2024-02-24 RX ADMIN — Medication 1000 MG: at 15:38

## 2024-02-24 ASSESSMENT — FIBROSIS 4 INDEX: FIB4 SCORE: 1.72

## 2024-02-24 NOTE — PROGRESS NOTES
Subjective:   CHIEF COMPLAINT  Chief Complaint   Patient presents with    Cough     Not better since Tuesday, chest congestion, cough, sob, right ear draining       HPI  Celia Shaffer is a 63 y.o. female who presents for evaluation of a continued productive cough.  Seen in urgent care 3 days ago and no improvement with albuterol.  Now she is experiencing some drainage from her right ear associate with pain.  Says cough is productive and has had a few episodes of blood-tinged sputum.  Albuterol helps but only provides transient relief of symptoms.  No chest pain.  Positive ROS for wheezing, shortness of breath and fatigue.    REVIEW OF SYSTEMS  General: no fever or chills  GI: no nausea or vomiting  See HPI for further details.    PAST MEDICAL HISTORY  Patient Active Problem List    Diagnosis Date Noted    Malignant neoplasm of breast (HCC) 02/03/2022    Depression 02/03/2022    PUD (peptic ulcer disease) 02/03/2022    Chronic neck pain 02/03/2022    Hypothyroidism 02/03/2022    Low back pain with sciatica 09/27/2016    Abnormal TSH 03/10/2016    History of myocardial infarction 02/09/2015    HSV infection 07/14/2014    Macrocytic anemia 08/22/2012    Dyslipidemia - neg CT cardiac scoring 07/13/2011    Coronary artery disease 08/02/2010       SURGICAL HISTORY   has a past surgical history that includes mastectomy bilateral subq (1991); breast reconstruction (98/05); other (2007); breast reconstruction (01/20/2010); breast implant removal (01/20/2010); capsulectomy (01/20/2010); tissue expander place/remove (01/20/2010); breast reconstruction (10/21/2010); tissue expander place/remove (10/21/2010); breast implant revision (10/21/2010); breast reconstruction (04/27/2011); liposuction (04/27/2011); tonsillectomy (01/16/2012); turbinate reduction (01/16/2012); uvulopharyngopalatoplasty (01/16/2012); septoplasty (01/16/2012); other; and breast implant revision (Bilateral).    ALLERGIES  Allergies   Allergen  "Reactions    Nkda [No Known Drug Allergy]        CURRENT MEDICATIONS  Home Medications       Reviewed by Go Thompson D.O. (Physician) on 24 at 1545  Med List Status: <None>     Medication Last Dose Status   acyclovir (ZOVIRAX) 400 MG tablet PRN Active   acyclovir (ZOVIRAX) 5 % Ointment PRN Active   albuterol 108 (90 Base) MCG/ACT Aero Soln inhalation aerosol Taking Active   amoxicillin-clavulanate (AUGMENTIN) 875-125 MG Tab  Active   benzonatate (TESSALON) 100 MG Cap Taking Active   Cholecalciferol (VITAMIN D PO) Taking Active   fluconazole (DIFLUCAN) 150 MG tablet  Active   multivitamin (THERAGRAN) TABS Taking Active   Omega-3 Fatty Acids (FISH OIL) 1000 MG Cap capsule Taking Active   vitamin E (VITAMIN E) 1000 UNIT Cap  Active   zolpidem (AMBIEN) 5 MG Tab PRN Active                    SOCIAL HISTORY  Social History     Tobacco Use    Smoking status: Former     Current packs/day: 0.00     Average packs/day: 0.5 packs/day for 10.0 years (5.0 ttl pk-yrs)     Types: Cigarettes     Start date: 1981     Quit date: 1991     Years since quittin.1    Smokeless tobacco: Never   Vaping Use    Vaping Use: Never used   Substance and Sexual Activity    Alcohol use: Yes     Comment: 2 per day    Drug use: No    Sexual activity: Not on file     Comment: , 1 child . working for the [employer]       FAMILY HISTORY  Family History   Problem Relation Age of Onset    Hypertension Mother     Arthritis Mother     Stroke Mother     Lung Disease Father         pulmonary fibrosis          Objective:   PHYSICAL EXAM  VITAL SIGNS: BP 98/52 (BP Location: Left arm, Patient Position: Sitting, BP Cuff Size: Adult)   Pulse 100   Temp 36.5 °C (97.7 °F) (Temporal)   Resp 16   Ht 1.702 m (5' 7\")   Wt 70.3 kg (155 lb)   SpO2 97%   BMI 24.28 kg/m²     Gen: no acute distress, normal voice  Skin: dry, intact, moist mucosal membranes  Eyes: No conjunctival injection b/l  Neck: Normal range of motion. No meningeal signs. "   ENT: Ruptured right tympanic membrane with erythema and middle ear.  Left TM clear intact without bulging, erythema or effusion.  No lymphadenopathy.  Lungs: No increased work of breathing.  CTAB w/ symmetric expansion  CV: RRR w/o murmurs or clicks  Psych: normal affect, normal judgement, alert, awake    Assessment/Plan:     1. Non-recurrent acute suppurative otitis media of right ear with spontaneous rupture of tympanic membrane  ipratropium-albuterol (DUONEB) nebulizer solution    acetaminophen (Tylenol) tablet 1,000 mg    amoxicillin-clavulanate (AUGMENTIN) 875-125 MG Tab      2. Bronchitis  fluticasone (FLONASE) 50 MCG/ACT nasal spray    Spacer/Aero-Holding Chambers (AEROCHAMBER PLUS-FLOW SIGNAL) Misc      3. Antibiotic-induced yeast infection  fluconazole (DIFLUCAN) 150 MG tablet      Patient was given a continue breathing treatment and said she felt much better.  I did not appreciate any adventitious lung sounds on examination.  No need for p.o. steroids at this point.  She also given 1 g of Tylenol.  -Ordered Augmentin  -Ordered Diflucan for yeast infection  -Ordered Flonase  -Daily Claritin  -Ordered spacer for her inhaler which was prescribed earlier this week.  Proper technique was reviewed  -Continue albuterol every 4-6 hours as needed.  Recommended 2 puffs prior to bedtime  -Tylenol 1000 mg every 8 hours as needed  -Return to urgent care any new/worsening symptoms or further questions or concerns.  Patient understood everything discussed.  All questions were answered.    Differential diagnosis and supportive care discussed. Follow-up as needed if symptoms worsen or fail to improve to PCP, Urgent care or Emergency Room.    Please note that this dictation was created using voice recognition software. I have made a reasonable attempt to correct obvious errors, but I expect that there are errors of grammar and possibly content that I did not discover before finalizing the note.

## 2024-03-11 ENCOUNTER — OFFICE VISIT (OUTPATIENT)
Dept: MEDICAL GROUP | Facility: LAB | Age: 64
End: 2024-03-11
Payer: COMMERCIAL

## 2024-03-11 VITALS
OXYGEN SATURATION: 98 % | DIASTOLIC BLOOD PRESSURE: 58 MMHG | WEIGHT: 164 LBS | HEIGHT: 67 IN | BODY MASS INDEX: 25.74 KG/M2 | SYSTOLIC BLOOD PRESSURE: 90 MMHG | TEMPERATURE: 97.2 F | HEART RATE: 78 BPM | RESPIRATION RATE: 16 BRPM

## 2024-03-11 DIAGNOSIS — R20.2 PARESTHESIA OF ARM: ICD-10-CM

## 2024-03-11 DIAGNOSIS — G89.29 CHRONIC NECK PAIN: ICD-10-CM

## 2024-03-11 DIAGNOSIS — M54.2 CHRONIC NECK PAIN: ICD-10-CM

## 2024-03-11 DIAGNOSIS — Z79.891 USE OF OPIATES FOR THERAPEUTIC PURPOSES: ICD-10-CM

## 2024-03-11 DIAGNOSIS — H69.91 DYSFUNCTION OF RIGHT EUSTACHIAN TUBE: ICD-10-CM

## 2024-03-11 DIAGNOSIS — M54.9 CHRONIC MID BACK PAIN: ICD-10-CM

## 2024-03-11 DIAGNOSIS — G89.29 CHRONIC MID BACK PAIN: ICD-10-CM

## 2024-03-11 DIAGNOSIS — M51.34 DDD (DEGENERATIVE DISC DISEASE), THORACIC: ICD-10-CM

## 2024-03-11 PROCEDURE — 3078F DIAST BP <80 MM HG: CPT | Performed by: NURSE PRACTITIONER

## 2024-03-11 PROCEDURE — 99214 OFFICE O/P EST MOD 30 MIN: CPT | Performed by: NURSE PRACTITIONER

## 2024-03-11 PROCEDURE — 3074F SYST BP LT 130 MM HG: CPT | Performed by: NURSE PRACTITIONER

## 2024-03-11 RX ORDER — TRAMADOL HYDROCHLORIDE 50 MG/1
50-100 TABLET ORAL EVERY 6 HOURS PRN
Qty: 30 TABLET | Refills: 0 | Status: SHIPPED | OUTPATIENT
Start: 2024-03-11 | End: 2024-03-18

## 2024-03-11 ASSESSMENT — FIBROSIS 4 INDEX: FIB4 SCORE: 1.72

## 2024-03-11 ASSESSMENT — PATIENT HEALTH QUESTIONNAIRE - PHQ9: CLINICAL INTERPRETATION OF PHQ2 SCORE: 0

## 2024-03-11 NOTE — PROGRESS NOTES
Verbal consent was acquired by the patient to use [employer] ambient listening note generation during this visit Yes      Subjective   Celia Shaffer is a 63 y.o. female who presents for back pain / ear fullness / URI f/u from .  History of Present Illness  Celia is a 63-year-old established female here with complaint of back pain which she describes in her neck and her upper back and between her shoulder blades.  She is also been to urgent care twice in the past few weeks with influenza and a sinus infection.    Known DDD both cervical and lumbar on x-rays last fall.  She did start going to physical therapy, but it did not help her. She has been having problems with her back on and off for years.  Back pain has flared up on her in the past after golf, lifting heavy items at work, etc.  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  The pain radiates from her neck to between the shoulder blades. The pain does not radiate down her leg. She still gets some numbness in her arms. Yoga will help periodically as does swimming. She does a lot of Epsom salt baths and uses a heating pad.  Was given a prescription of Percocet by urgent care on 22 February, 12 pills.  She has been taking a half Percocet in the evenings when she is in significant pain.  Tylenol has not been helpful for her.  She is unable to take NSAIDs because of a history of ulcer, see below.    She started taking Aleve for her back pain because Tylenol does not do anything for her pain.  Tells me that her ulcer started to act up again, so she started taking Prilosec.  Her stomach is feeling better with the Prilosec.    She had influenza for a couple of days. The first wave of it lasted for a week. The second week, she got a sinus and ear infection. She has been to urgent care twice. She still does not have hearing in her right ear. She has been through antibiotics. She is flying at the end of the month. She was so congested, they  "thought she might have pneumonia. They did a chest x-ray which was negative for pneumonia.  The urgent care doctor prescribed her Percocet for her back because she was having trouble with her coughing worsening her back pain. Her nasal discharge was green, yellow, and is now clear. She is still taking Sudafed. She is still using Flonase.   She does not drink alcohol.      Objective   BP 90/58 (BP Location: Left arm, Patient Position: Sitting, BP Cuff Size: Large adult)   Pulse 78   Temp 36.2 °C (97.2 °F)   Resp 16   Ht 1.702 m (5' 7\")   Wt 74.4 kg (164 lb)   SpO2 98%   Physical Exam  Gen: NAD  Resp: nonlabored.  Able to speak in full sentences  ENT: Tympanic membranes are translucent and intact bilaterally without erythema or perforation.  Psy: pleasant / cooperative.   Neuro:  Alert and oriented x 3      Results  Imaging  Chest x-ray was reviewed with the patient.       Assessment & Plan  1. Cervical / thoracic spine DDD:     I will order an MRI of her neck and thoracic spine based on pain level / worsening pain / paresthesia of arms. I will refer her to a physiatrist. I will prescribe tramadol for her pain as Tylenol is not helpful for her, we are having her avoid NSAIDs because of her history of peptic ulcer disease and I am concerned at the opiate level of oxycodone use in terms of the duration that she may need pain relief.  She may certainly retry 1 g of Tylenol up to 4 times daily and utilize tramadol if this is not helpful.  She will sign an opiate agreement today. She will continue doing PT at home. She was advised to strengthen her thoracic spine.    2. Sinus and ear infection - resolved.    She will continue Sudafed and Flonase until hearing returns which we discussed can be from persistent eustachian tube inflammation/fluid presence.           In prescribing controlled substances to this patient, I certify that I have obtained and reviewed the medical history of Celia Shaffer. I have also " made a good renny effort to obtain applicable records from other providers who have treated the patient and records did not demonstrate any increased risk of substance abuse that would prevent me from prescribing controlled substances.     I have conducted a physical exam and documented it. I have reviewed Ms. Shaffer’s prescription history as maintained by the [place] Prescription Monitoring Program.     I have assessed the patient’s risk for abuse, dependency, and addiction using the validated Opioid Risk Tool available at https://www.mdcalc.com/xknnio-bumv-jzem-ort-narcotic-abuse.     Given the above, I believe the benefits of controlled substance therapy outweigh the risks. The reasons for prescribing controlled substances include non-narcotic, oral analgesic alternatives have been inadequate for pain control. Accordingly, I have discussed the risk and benefits, treatment plan, and alternative therapies with the patient.         Please note that this dictation was created using voice recognition software. I have made every reasonable attempt to correct obvious errors, but I expect that there are errors of grammar and possibly content that I did not discover before finalizing the note.

## 2024-04-17 ENCOUNTER — PATIENT MESSAGE (OUTPATIENT)
Dept: MEDICAL GROUP | Facility: LAB | Age: 64
End: 2024-04-17
Payer: COMMERCIAL

## 2024-04-17 DIAGNOSIS — M54.50 ACUTE BILATERAL LOW BACK PAIN WITHOUT SCIATICA: ICD-10-CM

## 2024-04-18 ENCOUNTER — PATIENT MESSAGE (OUTPATIENT)
Dept: MEDICAL GROUP | Facility: LAB | Age: 64
End: 2024-04-18
Payer: COMMERCIAL

## 2024-04-18 DIAGNOSIS — M54.2 CHRONIC NECK PAIN: ICD-10-CM

## 2024-04-18 DIAGNOSIS — G89.29 CHRONIC MID BACK PAIN: ICD-10-CM

## 2024-04-18 DIAGNOSIS — G89.29 CHRONIC NECK PAIN: ICD-10-CM

## 2024-04-18 DIAGNOSIS — M54.9 CHRONIC MID BACK PAIN: ICD-10-CM

## 2024-04-18 RX ORDER — TRAMADOL HYDROCHLORIDE 50 MG/1
50-100 TABLET ORAL EVERY 6 HOURS PRN
Qty: 30 TABLET | Refills: 0 | Status: SHIPPED | OUTPATIENT
Start: 2024-04-18 | End: 2024-04-25

## 2024-04-30 ENCOUNTER — HOSPITAL ENCOUNTER (OUTPATIENT)
Dept: LAB | Facility: MEDICAL CENTER | Age: 64
End: 2024-04-30
Attending: NURSE PRACTITIONER
Payer: COMMERCIAL

## 2024-04-30 ENCOUNTER — PATIENT MESSAGE (OUTPATIENT)
Dept: MEDICAL GROUP | Facility: LAB | Age: 64
End: 2024-04-30

## 2024-04-30 DIAGNOSIS — R79.89 ABNORMAL CBC: ICD-10-CM

## 2024-04-30 LAB
25(OH)D3 SERPL-MCNC: 65 NG/ML (ref 30–100)
ALBUMIN SERPL BCP-MCNC: 4.3 G/DL (ref 3.2–4.9)
ALBUMIN/GLOB SERPL: 1.5 G/DL
ALP SERPL-CCNC: 95 U/L (ref 30–99)
ALT SERPL-CCNC: 46 U/L (ref 2–50)
ANION GAP SERPL CALC-SCNC: 12 MMOL/L (ref 7–16)
AST SERPL-CCNC: 37 U/L (ref 12–45)
BASOPHILS # BLD AUTO: 1.7 % (ref 0–1.8)
BASOPHILS # BLD: 0.08 K/UL (ref 0–0.12)
BILIRUB SERPL-MCNC: 0.3 MG/DL (ref 0.1–1.5)
BUN SERPL-MCNC: 16 MG/DL (ref 8–22)
CALCIUM ALBUM COR SERPL-MCNC: 9.3 MG/DL (ref 8.5–10.5)
CALCIUM SERPL-MCNC: 9.5 MG/DL (ref 8.5–10.5)
CHLORIDE SERPL-SCNC: 106 MMOL/L (ref 96–112)
CO2 SERPL-SCNC: 23 MMOL/L (ref 20–33)
CREAT SERPL-MCNC: 0.76 MG/DL (ref 0.5–1.4)
CRP SERPL HS-MCNC: 1 MG/L (ref 0–3)
DHEA-S SERPL-MCNC: 57.8 UG/DL (ref 18.9–205)
EOSINOPHIL # BLD AUTO: 0.22 K/UL (ref 0–0.51)
EOSINOPHIL NFR BLD: 4.8 % (ref 0–6.9)
ERYTHROCYTE [DISTWIDTH] IN BLOOD BY AUTOMATED COUNT: 49.7 FL (ref 35.9–50)
EST. AVERAGE GLUCOSE BLD GHB EST-MCNC: 108 MG/DL
ESTRADIOL SERPL-MCNC: <5 PG/ML
FERRITIN SERPL-MCNC: 185 NG/ML (ref 10–291)
FSH SERPL-ACNC: 64 MIU/ML
GFR SERPLBLD CREATININE-BSD FMLA CKD-EPI: 88 ML/MIN/1.73 M 2
GLOBULIN SER CALC-MCNC: 2.8 G/DL (ref 1.9–3.5)
GLUCOSE SERPL-MCNC: 92 MG/DL (ref 65–99)
HBA1C MFR BLD: 5.4 % (ref 4–5.6)
HCT VFR BLD AUTO: 36.6 % (ref 37–47)
HGB BLD-MCNC: 12.3 G/DL (ref 12–16)
IMM GRANULOCYTES # BLD AUTO: 0.01 K/UL (ref 0–0.11)
IMM GRANULOCYTES NFR BLD AUTO: 0.2 % (ref 0–0.9)
LYMPHOCYTES # BLD AUTO: 2.28 K/UL (ref 1–4.8)
LYMPHOCYTES NFR BLD: 49.2 % (ref 22–41)
MCH RBC QN AUTO: 33.9 PG (ref 27–33)
MCHC RBC AUTO-ENTMCNC: 33.6 G/DL (ref 32.2–35.5)
MCV RBC AUTO: 100.8 FL (ref 81.4–97.8)
MONOCYTES # BLD AUTO: 0.4 K/UL (ref 0–0.85)
MONOCYTES NFR BLD AUTO: 8.6 % (ref 0–13.4)
NEUTROPHILS # BLD AUTO: 1.64 K/UL (ref 1.82–7.42)
NEUTROPHILS NFR BLD: 35.5 % (ref 44–72)
NRBC # BLD AUTO: 0 K/UL
NRBC BLD-RTO: 0 /100 WBC (ref 0–0.2)
PLATELET # BLD AUTO: 236 K/UL (ref 164–446)
PMV BLD AUTO: 9.7 FL (ref 9–12.9)
POTASSIUM SERPL-SCNC: 4.9 MMOL/L (ref 3.6–5.5)
PROT SERPL-MCNC: 7.1 G/DL (ref 6–8.2)
RBC # BLD AUTO: 3.63 M/UL (ref 4.2–5.4)
SODIUM SERPL-SCNC: 141 MMOL/L (ref 135–145)
T3FREE SERPL-MCNC: 3.18 PG/ML (ref 2–4.4)
T4 FREE SERPL-MCNC: 1.21 NG/DL (ref 0.93–1.7)
TSH SERPL DL<=0.005 MIU/L-ACNC: 3.02 UIU/ML (ref 0.38–5.33)
VIT B12 SERPL-MCNC: 1510 PG/ML (ref 211–911)
WBC # BLD AUTO: 4.6 K/UL (ref 4.8–10.8)

## 2024-05-05 LAB
SHBG SERPL-SCNC: 146 NMOL/L (ref 17–125)
TESTOST FREE SERPL-MCNC: 0.9 PG/ML (ref 0.6–3.8)
TESTOST SERPL-MCNC: 15 NG/DL (ref 5–32)

## 2024-05-10 ENCOUNTER — HOSPITAL ENCOUNTER (OUTPATIENT)
Dept: LAB | Facility: MEDICAL CENTER | Age: 64
End: 2024-05-10
Attending: NURSE PRACTITIONER
Payer: COMMERCIAL

## 2024-05-10 DIAGNOSIS — R79.89 ABNORMAL CBC: ICD-10-CM

## 2024-05-12 LAB
ANNOTATION COMMENT IMP: ABNORMAL
CD19 CELLS NFR SPEC: 11 % (ref 6–23)
CD3 CELLS # BLD: 2402 CELLS/UL (ref 570–2400)
CD3 CELLS NFR SPEC: 81 % (ref 62–87)
CD3+CD4+ CELLS # BLD: 1276 CELLS/UL (ref 430–1800)
CD3+CD4+ CELLS NFR BLD: 43 % (ref 32–64)
CD3+CD4+ CELLS/CD3+CD8+ CLL BLD: 1.16 RATIO (ref 0.8–3.9)
CD3+CD8+ CELLS # BLD: 1106 CELLS/UL (ref 210–1200)
CD3+CD8+ CELLS NFR SPEC: 37 % (ref 15–46)
CD3-CD16+CD56+ CELLS # SPEC: 204 CELLS/UL (ref 78–470)
CD3-CD16+CD56+ CELLS NFR SPEC: 7 % (ref 4–26)
CELLS.CD3-CD19+ [#/VOLUME] IN BLOOD: 332 CELLS/UL (ref 91–610)

## 2024-05-13 LAB
IGA SERPL-MCNC: 186 MG/DL (ref 68–408)
IGG SERPL-MCNC: 1054 MG/DL (ref 768–1632)
IGM SERPL-MCNC: 65 MG/DL (ref 35–263)

## 2024-05-21 ENCOUNTER — OFFICE VISIT (OUTPATIENT)
Dept: MEDICAL GROUP | Facility: LAB | Age: 64
End: 2024-05-21
Payer: COMMERCIAL

## 2024-05-21 VITALS
DIASTOLIC BLOOD PRESSURE: 62 MMHG | HEART RATE: 76 BPM | RESPIRATION RATE: 16 BRPM | BODY MASS INDEX: 25.74 KG/M2 | TEMPERATURE: 97.1 F | HEIGHT: 67 IN | OXYGEN SATURATION: 97 % | WEIGHT: 164 LBS | SYSTOLIC BLOOD PRESSURE: 100 MMHG

## 2024-05-21 DIAGNOSIS — B00.9 HSV INFECTION: ICD-10-CM

## 2024-05-21 DIAGNOSIS — Z85.3 HISTORY OF BREAST CANCER: ICD-10-CM

## 2024-05-21 DIAGNOSIS — R60.9 DEPENDENT EDEMA: ICD-10-CM

## 2024-05-21 DIAGNOSIS — G89.29 CHRONIC MID BACK PAIN: ICD-10-CM

## 2024-05-21 DIAGNOSIS — G89.29 CHRONIC NECK PAIN: ICD-10-CM

## 2024-05-21 DIAGNOSIS — M54.2 CHRONIC NECK PAIN: ICD-10-CM

## 2024-05-21 DIAGNOSIS — J45.20 MILD INTERMITTENT REACTIVE AIRWAY DISEASE WITHOUT COMPLICATION: ICD-10-CM

## 2024-05-21 DIAGNOSIS — Z79.891 USE OF OPIATES FOR THERAPEUTIC PURPOSES: ICD-10-CM

## 2024-05-21 DIAGNOSIS — R53.83 OTHER FATIGUE: ICD-10-CM

## 2024-05-21 DIAGNOSIS — M54.9 CHRONIC MID BACK PAIN: ICD-10-CM

## 2024-05-21 PROBLEM — J10.1 INFLUENZA A: Status: ACTIVE | Noted: 2024-05-21

## 2024-05-21 PROBLEM — J10.1 INFLUENZA A: Status: RESOLVED | Noted: 2024-05-21 | Resolved: 2024-05-21

## 2024-05-21 PROCEDURE — 99214 OFFICE O/P EST MOD 30 MIN: CPT | Performed by: NURSE PRACTITIONER

## 2024-05-21 PROCEDURE — 3078F DIAST BP <80 MM HG: CPT | Performed by: NURSE PRACTITIONER

## 2024-05-21 PROCEDURE — 3074F SYST BP LT 130 MM HG: CPT | Performed by: NURSE PRACTITIONER

## 2024-05-21 RX ORDER — ACYCLOVIR 400 MG/1
400 TABLET ORAL 3 TIMES DAILY
Qty: 90 TABLET | Refills: 4 | Status: SHIPPED | OUTPATIENT
Start: 2024-05-21

## 2024-05-21 RX ORDER — ALBUTEROL SULFATE 90 UG/1
2 AEROSOL, METERED RESPIRATORY (INHALATION) EVERY 6 HOURS PRN
Qty: 8.5 G | Refills: 5 | Status: SHIPPED | OUTPATIENT
Start: 2024-05-21

## 2024-05-21 RX ORDER — TRAMADOL HYDROCHLORIDE 50 MG/1
50-100 TABLET ORAL EVERY 6 HOURS PRN
Qty: 30 TABLET | Refills: 2 | Status: SHIPPED | OUTPATIENT
Start: 2024-05-21 | End: 2024-05-28

## 2024-05-21 ASSESSMENT — FIBROSIS 4 INDEX: FIB4 SCORE: 1.43

## 2024-05-21 NOTE — PROGRESS NOTES
Verbal consent was acquired by the patient to use [employer] ambient listening note generation during this visit Yes      Subjective   Celia Shaffer is a 63 y.o. female who presents for f/u  History of Present Illness  The patient is a 63-year-old established female here for follow-up.    Neck pain: Improving.  Still stiff.  Also has low back pain.  Struggled to get the MRI approved by insurance and never had it done.  No longer having back spasms.  Despite her semi-remission status of her back, she remains apprehensive about strengthening exercises to prevent exacerbating her symptoms. She reports experiencing three episodes of severe pain within a six-month period. Tramadol, taken post-shift work and Tylenol, has been effective in managing her pain, and she requests a refill.    The patient presents with a red, non-pruritic rash on the inner aspect of her ankles, which has been present for a few months intermittently.  However, the frequency of these flare-ups has increased, occurring approximately once a month and persisting for 3 to 4 days. Elevating her feet provides some relief.  Does have periodate mild swelling in her ankles.  She occasionally experiences fatigue and aching in her legs during physical activity. She also reports heaviness in her lower legs. Her new job at [employer] requires prolonged standing, which commenced in 11/2023.     Chronic macrocytosis: Improved compared to 7 years ago when she stopped drinking alcohol.  The patient denies any blood loss, such as blood in her stool, urine, or nosebleeds. She is currently taking B12 supplements.     She has a history of breast cancer approximately 30 years ago and had a double mastectomy.  She does not recall if her cancer was estrogen receptor positive. She tested negative for BRCA gene. She hopes that hormone replacement therapy will alleviate her fatigue and vaginal dryness. She did not require tamoxifen following her breast cancer. She  "does not undergo mammograms due to lack of breast tissue.    The patient reports experiencing significant fatigue, particularly in the evenings. Despite attempts to walk 10,000 steps daily, she experiences exhaustion after working 4 to 5 hours. She underwent a sleep study and underwent a tonsillectomy and uvulectomy in 2012,.  She consumes a couple of cups of coffee in the morning, but not in the afternoon. She sleeps well and does not snore. She occasionally takes Ambien. Her earliest work hour is 9:30 PM.  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    The patient requests a refill of her albuterol inhaler. She occasionally experiences nocturnal coughing.     She smoked for 10 years and quit in 1991. She does not drink alcohol.   She denies any family history of ovarian or uterine cancer.    Review of Systems  Negative except for HPI  Objective   /62 (BP Location: Left arm, Patient Position: Sitting, BP Cuff Size: Large adult)   Pulse 76   Temp 36.2 °C (97.1 °F)   Resp 16   Ht 1.702 m (5' 7\")   Wt 74.4 kg (164 lb)   SpO2 97%   Physical Exam  Gen. appears healthy in no distress   Skin appropriate for sex and age   Neck trachea is midline  Lungs unlabored breathing  Heart regular rate.  No pitting lower extremity edema.  No rash present today to her legs.  Neuro gait and station normal   Psych appropriate, calm, interactive, well-groomed    Results  Laboratory Studies  White blood cell count is normal. Red count is on the lower end of normal. IgG is normal.       Assessment & Plan  1.  Neck and low back pain:  The patient is advised to gradually resume yoga/strengthening exercises.  Refill tramadol.  Controlled substance agreement signed.  Reviewed  profile.  She will follow-up with me if her pain returns in the significant nature.    2. Bilateral ankle rash.  Likely from lower extremity dependent edema due to her new job of standing for 4 to 6 hours several days per week which was initiated back in " November.  The patient is advised to wear compression socks daily, preferably before getting out of bed and removing them before bedtime on workdays.  She will let me know if this does not help.  Encouraged her to pump her feet when standing.    3.  Fatigue: Reviewed all recent labs.  She is considering bioidentical hormone replacement therapy.  She does have a history of breast cancer 30 years ago and had a double mastectomy.  We have no idea of her breast cancer was estrogen receptor positive.  Discussed risk versus benefit of HRT.  She will let me know how she is feeling if she does decide to move forward with HRT through an age .    4. Reactive airway disease.  A refill of her albuterol inhaler has been provided.    5..  Cold sores: Refilled acyclovir.    In prescribing controlled substances to this patient, I certify that I have obtained and reviewed the medical history of Celia Shaffer. I have also made a good renny effort to obtain applicable records from other providers who have treated the patient and records did not demonstrate any increased risk of substance abuse that would prevent me from prescribing controlled substances.     I have conducted a physical exam and documented it. I have reviewed Ms. Shaffer’s prescription history as maintained by the [place] Prescription Monitoring Program.     I have assessed the patient’s risk for abuse, dependency, and addiction using the validated Opioid Risk Tool available at https://www.mdcalc.com/itaahp-hwom-uphj-ort-narcotic-abuse.     Given the above, I believe the benefits of controlled substance therapy outweigh the risks. The reasons for prescribing controlled substances include non-narcotic, oral analgesic alternatives have been inadequate for pain control. Accordingly, I have discussed the risk and benefits, treatment plan, and alternative therapies with the patient.                Please note that this dictation was created  using voice recognition software. I have made every reasonable attempt to correct obvious errors, but I expect that there are errors of grammar and possibly content that I did not discover before finalizing the note.

## 2024-08-09 ENCOUNTER — HOSPITAL ENCOUNTER (OUTPATIENT)
Dept: LAB | Facility: MEDICAL CENTER | Age: 64
End: 2024-08-09
Attending: NURSE PRACTITIONER
Payer: COMMERCIAL

## 2024-08-09 LAB
BASOPHILS # BLD AUTO: 1.5 % (ref 0–1.8)
BASOPHILS # BLD: 0.07 K/UL (ref 0–0.12)
EOSINOPHIL # BLD AUTO: 0.22 K/UL (ref 0–0.51)
EOSINOPHIL NFR BLD: 4.9 % (ref 0–6.9)
ERYTHROCYTE [DISTWIDTH] IN BLOOD BY AUTOMATED COUNT: 48.4 FL (ref 35.9–50)
ESTRADIOL SERPL-MCNC: 55.1 PG/ML
FSH SERPL-ACNC: 31.5 MIU/ML
HCT VFR BLD AUTO: 37 % (ref 37–47)
HGB BLD-MCNC: 12.4 G/DL (ref 12–16)
IMM GRANULOCYTES # BLD AUTO: 0.01 K/UL (ref 0–0.11)
IMM GRANULOCYTES NFR BLD AUTO: 0.2 % (ref 0–0.9)
LYMPHOCYTES # BLD AUTO: 2.07 K/UL (ref 1–4.8)
LYMPHOCYTES NFR BLD: 45.7 % (ref 22–41)
MCH RBC QN AUTO: 34.2 PG (ref 27–33)
MCHC RBC AUTO-ENTMCNC: 33.5 G/DL (ref 32.2–35.5)
MCV RBC AUTO: 101.9 FL (ref 81.4–97.8)
MONOCYTES # BLD AUTO: 0.5 K/UL (ref 0–0.85)
MONOCYTES NFR BLD AUTO: 11 % (ref 0–13.4)
NEUTROPHILS # BLD AUTO: 1.66 K/UL (ref 1.82–7.42)
NEUTROPHILS NFR BLD: 36.7 % (ref 44–72)
NRBC # BLD AUTO: 0 K/UL
NRBC BLD-RTO: 0 /100 WBC (ref 0–0.2)
PLATELET # BLD AUTO: 250 K/UL (ref 164–446)
PMV BLD AUTO: 9.4 FL (ref 9–12.9)
RBC # BLD AUTO: 3.63 M/UL (ref 4.2–5.4)
WBC # BLD AUTO: 4.5 K/UL (ref 4.8–10.8)

## 2024-08-09 PROCEDURE — 84403 ASSAY OF TOTAL TESTOSTERONE: CPT

## 2024-08-09 PROCEDURE — 84270 ASSAY OF SEX HORMONE GLOBUL: CPT

## 2024-08-09 PROCEDURE — 84402 ASSAY OF FREE TESTOSTERONE: CPT

## 2024-08-09 PROCEDURE — 36415 COLL VENOUS BLD VENIPUNCTURE: CPT

## 2024-08-09 PROCEDURE — 85025 COMPLETE CBC W/AUTO DIFF WBC: CPT

## 2024-08-09 PROCEDURE — 82670 ASSAY OF TOTAL ESTRADIOL: CPT

## 2024-08-09 PROCEDURE — 83001 ASSAY OF GONADOTROPIN (FSH): CPT

## 2024-08-15 LAB
SHBG SERPL-SCNC: 173 NMOL/L (ref 17–125)
TESTOST FREE SERPL-MCNC: 9.5 PG/ML (ref 0.6–3.8)
TESTOST SERPL-MCNC: 186 NG/DL (ref 5–32)

## 2024-09-23 ENCOUNTER — APPOINTMENT (OUTPATIENT)
Dept: MEDICAL GROUP | Facility: LAB | Age: 64
End: 2024-09-23
Payer: COMMERCIAL

## 2024-09-25 ENCOUNTER — OFFICE VISIT (OUTPATIENT)
Dept: MEDICAL GROUP | Facility: LAB | Age: 64
End: 2024-09-25
Payer: COMMERCIAL

## 2024-09-25 VITALS
HEART RATE: 70 BPM | OXYGEN SATURATION: 97 % | WEIGHT: 170 LBS | HEIGHT: 67 IN | BODY MASS INDEX: 26.68 KG/M2 | RESPIRATION RATE: 16 BRPM | TEMPERATURE: 98 F | SYSTOLIC BLOOD PRESSURE: 90 MMHG | DIASTOLIC BLOOD PRESSURE: 50 MMHG

## 2024-09-25 DIAGNOSIS — R06.09 POST-COVID CHRONIC DYSPNEA: ICD-10-CM

## 2024-09-25 DIAGNOSIS — M54.6 CHRONIC THORACIC SPINE PAIN: ICD-10-CM

## 2024-09-25 DIAGNOSIS — G89.29 CHRONIC BILATERAL LOW BACK PAIN WITHOUT SCIATICA: ICD-10-CM

## 2024-09-25 DIAGNOSIS — U09.9 POST-COVID CHRONIC DYSPNEA: ICD-10-CM

## 2024-09-25 DIAGNOSIS — G47.9 SLEEP DISTURBANCE: ICD-10-CM

## 2024-09-25 DIAGNOSIS — M54.50 CHRONIC BILATERAL LOW BACK PAIN WITHOUT SCIATICA: ICD-10-CM

## 2024-09-25 DIAGNOSIS — G89.29 CHRONIC THORACIC SPINE PAIN: ICD-10-CM

## 2024-09-25 PROCEDURE — 3074F SYST BP LT 130 MM HG: CPT | Performed by: NURSE PRACTITIONER

## 2024-09-25 PROCEDURE — 99214 OFFICE O/P EST MOD 30 MIN: CPT | Performed by: NURSE PRACTITIONER

## 2024-09-25 PROCEDURE — 3078F DIAST BP <80 MM HG: CPT | Performed by: NURSE PRACTITIONER

## 2024-09-25 RX ORDER — ZOLPIDEM TARTRATE 5 MG/1
5 TABLET ORAL NIGHTLY PRN
Qty: 30 TABLET | Refills: 2 | Status: SHIPPED | OUTPATIENT
Start: 2024-09-25 | End: 2024-10-25

## 2024-09-25 RX ORDER — TOBRAMYCIN 3 MG/ML
1 SOLUTION/ DROPS OPHTHALMIC EVERY 4 HOURS
Qty: 5 ML | Refills: 0 | Status: SHIPPED | OUTPATIENT
Start: 2024-09-25

## 2024-09-25 ASSESSMENT — FIBROSIS 4 INDEX: FIB4 SCORE: 1.37

## 2024-09-25 NOTE — PROGRESS NOTES
Verbal consent was acquired by the patient to use [employer] ambient listening note generation during this visit Yes      Subjective   Celia Shaffer is a 64 y.o. female who presents for fatigue, etc.   History of Present Illness  The patient is a 64-year-old established female presenting with concerns that she may have long COVID-19. She contracted COVID-19 in 08/2024 for the third or fourth time and has not fully recovered since. She continues to suffer from respiratory issues, fatigue, and body aches. During her last bout of COVID-19, she lost her sense of taste and smell. Recently, she experienced wheezing during a walk and continues to cough.    Prior to her COVID-19 infection, she was already experiencing health issues such as extreme fatigue. HRT did not alleviate her symptoms, and she experienced weight gain and bloating. She also had vaginal dryness, which was not relieved by the hormone therapy. Her energy levels have been low, leading her to reduce her work hours drastically / stop working.  She is no longer on testosterone or estrogen therapy.    She reports poor sleep quality, averaging around 6 hours per night. Occasionally, she takes Ambien, which helps her sleep better. She does not consume alcohol and takes B12 supplements daily. She does not frequently use antacids. She believes her back pain contributes to her fatigue. She is currently taking acyclovir daily and tramadol and Tylenol for pain relief. She reports normal bowel movements and urination, and does not experience any nausea, vomiting, or abdominal pain.    She experiences discomfort in the middle of her back, described as an ache located between her cervical and thoracic spine. The pain does not radiate down her legs. Physical therapy did not provide relief. She attempted yoga twice, but each time resulted in injury. She has access to a pool and enjoys swimming. She previously had an MRI of her chest when she was suspected to  "have pneumonia.    This morning, she reported a burning sensation in her eyes and noticed some redness.  Her son has pink eye.     Review of Systems  Neg except hpi  Objective   BP 90/50 (BP Location: Left arm, Patient Position: Sitting, BP Cuff Size: Large adult)   Pulse 70   Temp 36.7 °C (98 °F)   Resp 16   Ht 1.702 m (5' 7\")   Wt 77.1 kg (170 lb)   SpO2 97%   Physical Exam  Thyroid is not enlarged.  Lungs are clear.  Heart sounds are normal.  M/s: She has bilateral upper thoracic paraspinal muscular tenderness but no bony step-offs/bony tenderness.  Results  Reviewed all labs over the past year with her today in the office.    Imaging  Chest X-ray shows moderate to severe compression of mid to upper thoracic spine.       Assessment & Plan  1. Fatigue.  Her lab results do not indicate any significant abnormalities that could explain her fatigue. It is likely that her fatigue is due to a combination of factors such as pain, stress, post-COVID syndrome, and lack of sleep. Her thyroid examination was normal. She was advised to maintain her B12 supplementation. A prescription for Ambien was provided to help reset her sleep cycle. She was encouraged to engage in regular physical activity, specifically swimming for 30 to 45 minutes daily, to strengthen her trunk muscles. Allergy medication, such as generic Claritin, was recommended to reduce lung inflammation following COVID. If there is no improvement after 7 to 10 days, asthma medication will be considered -Singulair.    2.  Thoracic back pain.  An MRI of the  thoracic spine was ordered. She was advised to schedule an appointment with a physiatrist.  These were ordered for her as well in March/April of last year but she struggled to get these approved through her insurance, we will try again.  She tried physical therapy for several months without improvement.  She has tried trunk strengthening.  She will try swimming.  She will continue with tramadol and " ibuprofen or Tylenol as needed.    3.  Exposure to pinkeye:  Very mild symptoms today, if any.  Patient feels subjectively some eye irritation.  She was given a prescription of tobramycin to start if she has an injected conjunctiva with purulent mucus production from her eye.  Discussed contagiousness precautions.  A prescription for eye drops was provided, to be used every 4 hours for 5 days if symptoms worsen.                   Please note that this dictation was created using voice recognition software. I have made every reasonable attempt to correct obvious errors, but I expect that there are errors of grammar and possibly content that I did not discover before finalizing the note.

## 2024-09-25 NOTE — PATIENT INSTRUCTIONS
[address]  [facility] [address]  [phone]    Dr Hemphill and Dr. Campbell.

## 2024-10-14 ENCOUNTER — APPOINTMENT (OUTPATIENT)
Dept: PHYSICAL MEDICINE AND REHAB | Facility: MEDICAL CENTER | Age: 64
End: 2024-10-14
Payer: COMMERCIAL

## 2024-10-14 VITALS
DIASTOLIC BLOOD PRESSURE: 64 MMHG | SYSTOLIC BLOOD PRESSURE: 113 MMHG | OXYGEN SATURATION: 95 % | WEIGHT: 174.2 LBS | TEMPERATURE: 97.6 F | HEART RATE: 71 BPM | BODY MASS INDEX: 27.34 KG/M2 | HEIGHT: 67 IN

## 2024-10-14 DIAGNOSIS — M54.12 CERVICAL RADICULOPATHY: ICD-10-CM

## 2024-10-14 DIAGNOSIS — R20.0 HAND NUMBNESS: ICD-10-CM

## 2024-10-14 DIAGNOSIS — M47.812 CERVICAL SPONDYLOSIS: ICD-10-CM

## 2024-10-14 DIAGNOSIS — M54.2 CHRONIC NECK PAIN: ICD-10-CM

## 2024-10-14 DIAGNOSIS — G89.29 CHRONIC BILATERAL THORACIC BACK PAIN: ICD-10-CM

## 2024-10-14 DIAGNOSIS — M89.8X1 PERISCAPULAR PAIN: ICD-10-CM

## 2024-10-14 DIAGNOSIS — M54.6 CHRONIC BILATERAL THORACIC BACK PAIN: ICD-10-CM

## 2024-10-14 DIAGNOSIS — G89.29 CHRONIC NECK PAIN: ICD-10-CM

## 2024-10-14 PROCEDURE — 99204 OFFICE O/P NEW MOD 45 MIN: CPT | Performed by: PHYSICAL MEDICINE & REHABILITATION

## 2024-10-14 PROCEDURE — 3074F SYST BP LT 130 MM HG: CPT | Performed by: PHYSICAL MEDICINE & REHABILITATION

## 2024-10-14 PROCEDURE — 3078F DIAST BP <80 MM HG: CPT | Performed by: PHYSICAL MEDICINE & REHABILITATION

## 2024-10-14 PROCEDURE — 1125F AMNT PAIN NOTED PAIN PRSNT: CPT | Performed by: PHYSICAL MEDICINE & REHABILITATION

## 2024-10-14 ASSESSMENT — PATIENT HEALTH QUESTIONNAIRE - PHQ9
SUM OF ALL RESPONSES TO PHQ QUESTIONS 1-9: 8
CLINICAL INTERPRETATION OF PHQ2 SCORE: 1
5. POOR APPETITE OR OVEREATING: 1 - SEVERAL DAYS

## 2024-10-14 ASSESSMENT — PAIN SCALES - GENERAL: PAINLEVEL: 6=MODERATE PAIN

## 2024-10-14 ASSESSMENT — FIBROSIS 4 INDEX: FIB4 SCORE: 1.37

## 2024-12-04 DIAGNOSIS — G89.29 CHRONIC NECK PAIN: ICD-10-CM

## 2024-12-04 DIAGNOSIS — M54.9 CHRONIC MID BACK PAIN: ICD-10-CM

## 2024-12-04 DIAGNOSIS — G89.29 CHRONIC MID BACK PAIN: ICD-10-CM

## 2024-12-04 DIAGNOSIS — M54.2 CHRONIC NECK PAIN: ICD-10-CM

## 2024-12-04 NOTE — TELEPHONE ENCOUNTER
Received request via: Pharmacy    Was the patient seen in the last year in this department? Yes    Does the patient have an active prescription (recently filled or refills available) for medication(s) requested? No    Pharmacy Name: [facility] [address] [place] [address], [place] - [address]     Does the patient have intermediate Plus and need 100-day supply? (This applies to ALL medications) Patient does not have SCP

## 2024-12-05 RX ORDER — TRAMADOL HYDROCHLORIDE 50 MG/1
50-100 TABLET ORAL EVERY 8 HOURS PRN
Qty: 30 TABLET | Refills: 0 | Status: SHIPPED | OUTPATIENT
Start: 2024-12-05 | End: 2024-12-12

## 2024-12-23 ENCOUNTER — HOSPITAL ENCOUNTER (OUTPATIENT)
Dept: RADIOLOGY | Facility: MEDICAL CENTER | Age: 64
End: 2024-12-23
Attending: PHYSICAL MEDICINE & REHABILITATION
Payer: COMMERCIAL

## 2024-12-23 DIAGNOSIS — G89.29 CHRONIC NECK PAIN: ICD-10-CM

## 2024-12-23 DIAGNOSIS — M89.8X1 PERISCAPULAR PAIN: ICD-10-CM

## 2024-12-23 DIAGNOSIS — M47.812 CERVICAL SPONDYLOSIS: ICD-10-CM

## 2024-12-23 DIAGNOSIS — M54.12 CERVICAL RADICULOPATHY: ICD-10-CM

## 2024-12-23 DIAGNOSIS — R20.0 HAND NUMBNESS: ICD-10-CM

## 2024-12-23 DIAGNOSIS — M54.2 CHRONIC NECK PAIN: ICD-10-CM

## 2024-12-23 PROCEDURE — 72141 MRI NECK SPINE W/O DYE: CPT

## 2025-01-22 ENCOUNTER — OFFICE VISIT (OUTPATIENT)
Dept: PHYSICAL MEDICINE AND REHAB | Facility: MEDICAL CENTER | Age: 65
End: 2025-01-22
Payer: COMMERCIAL

## 2025-01-22 VITALS
TEMPERATURE: 97.9 F | WEIGHT: 174 LBS | OXYGEN SATURATION: 99 % | SYSTOLIC BLOOD PRESSURE: 123 MMHG | BODY MASS INDEX: 27.31 KG/M2 | HEIGHT: 67 IN | HEART RATE: 79 BPM | DIASTOLIC BLOOD PRESSURE: 68 MMHG

## 2025-01-22 DIAGNOSIS — M47.812 CERVICAL SPONDYLOSIS: ICD-10-CM

## 2025-01-22 DIAGNOSIS — M54.2 CHRONIC NECK PAIN: ICD-10-CM

## 2025-01-22 DIAGNOSIS — M89.8X1 PERISCAPULAR PAIN: ICD-10-CM

## 2025-01-22 DIAGNOSIS — G89.29 CHRONIC NECK PAIN: ICD-10-CM

## 2025-01-22 DIAGNOSIS — M54.12 CERVICAL RADICULOPATHY: ICD-10-CM

## 2025-01-22 PROCEDURE — 3074F SYST BP LT 130 MM HG: CPT | Performed by: PHYSICAL MEDICINE & REHABILITATION

## 2025-01-22 PROCEDURE — 1125F AMNT PAIN NOTED PAIN PRSNT: CPT | Performed by: PHYSICAL MEDICINE & REHABILITATION

## 2025-01-22 PROCEDURE — 3078F DIAST BP <80 MM HG: CPT | Performed by: PHYSICAL MEDICINE & REHABILITATION

## 2025-01-22 PROCEDURE — 99214 OFFICE O/P EST MOD 30 MIN: CPT | Performed by: PHYSICAL MEDICINE & REHABILITATION

## 2025-01-22 ASSESSMENT — PAIN SCALES - GENERAL: PAINLEVEL_OUTOF10: 6=MODERATE PAIN

## 2025-01-22 ASSESSMENT — FIBROSIS 4 INDEX: FIB4 SCORE: 1.37

## 2025-01-22 ASSESSMENT — PATIENT HEALTH QUESTIONNAIRE - PHQ9
CLINICAL INTERPRETATION OF PHQ2 SCORE: 2
SUM OF ALL RESPONSES TO PHQ QUESTIONS 1-9: 5
5. POOR APPETITE OR OVEREATING: 0 - NOT AT ALL

## 2025-01-22 NOTE — H&P (VIEW-ONLY)
Follow up patient note  Interventional spine and Pain  Physiatry (physical medicine and  Rehabilitation)       Chief complaint:   Chief Complaint   Patient presents with    Follow-Up     Neck pain          HISTORY    Please see new patient note by Dr Hemphill,  for more details.     HPI  Patient identification: Celia Shaffer ,  1960,   With Diagnoses of Cervical radiculopathy, Cervical spondylosis, Chronic neck pain, and Periscapular pain were pertinent to this visit.       Verbal consent was obtained for Benito copilot : Yes      History of Present Illness  The patient is a 64-year-old female presenting for a follow-up visit.    She reports chronic cervicalgia, with a pain intensity rated at 6 out of 10. Additionally, she describes persistent, dull thoracic pain localized to the mid-back region, specifically between the scapulae and slightly inferiorly. This bilateral pain also reaches an intensity of 6 out of 10 at its peak. Furthermore, the patient experiences nocturnal paresthesia and numbness in her right upper extremity.  The pain radiates down the bilateral arms right worse than left.  Chronic.    She has done a provider driven home exercise program including the past 6 months.  She has tried tramadol, acetaminophen she cannot tolerate NSAIDs or muscle relaxers.    Conservative treatments including the past two months within the past six months  NSAIDs: yes  Acetaminophen: yes  Home exercise program or physical therapy: yes  Activity modification: yes       ROS Red Flags :   Fever, Chills, Sweats: Denies  Involuntary Weight Loss: Denies  Bowel/Bladder Incontinence: Denies  Saddle Anesthesia: Denies        PMHx:   Past Medical History:   Diagnosis Date    Anesthesia     drop in b/p in pacu, trouble w/pain mgmt, ponv, father had hx of convulsions after surgery    ASTHMA     Breast cancer (HCC)     Right    Breath shortness     ongoing for past year    CAD (coronary artery disease)     previour  inferior wall infarct     Cancer (HCC) 1992    breast    CHEST PAIN 11/9/2012    Chronic neck pain     Cold     Depression     Dyslipidemia 7/13/2011    Hypothyroidism     Macrocytic anemia 8/22/2012    Myocardial infarct (HCC)     previous infarct on stress test     Pneumonia 2008    PUD (peptic ulcer disease)     Sleep apnea     sleep study done, has CPAP       PSHx:   Past Surgical History:   Procedure Laterality Date    TONSILLECTOMY  01/16/2012    Performed by LIMA PATEL at SURGERY SAME DAY [facility] ORS    TURBINATE REDUCTION  01/16/2012    Performed by LIMA PATEL at SURGERY SAME DAY [facility] ORS    UVULOPHARYNGOPALATOPLASTY  01/16/2012    Performed by LIMA PATEL at SURGERY SAME DAY [facility] ORS    SEPTOPLASTY  01/16/2012    Performed by LIMA PATEL at SURGERY SAME DAY [facility] ORS    BREAST RECONSTRUCTION  04/27/2011    Performed by LARA MICHEL at [facility]    LIPOSUCTION  04/27/2011    Performed by LARA MICHEL at [facility]    BREAST RECONSTRUCTION  10/21/2010    Performed by LARA MICHEL at [facility]    TISSUE EXPANDER PLACE/REMOVE  10/21/2010    Performed by LARA MICHEL at [facility]    BREAST IMPLANT REVISION  10/21/2010    Performed by LARA MICHEL at [facility]    BREAST RECONSTRUCTION  01/20/2010    Performed by LARA MICHEL at [facility]    BREAST IMPLANT REMOVAL  01/20/2010    Performed by LARA MICHEL at [facility] ORS    CAPSULECTOMY  01/20/2010    Performed by LARA MICHEL at [facility]    TISSUE EXPANDER PLACE/REMOVE  01/20/2010    Performed by LARA MICHEL at [facility]    OTHER  2007    uterine ablation    MASTECTOMY BILATERAL SUBQ  1991    BREAST IMPLANT REVISION Bilateral     Dr. Molina - 2/2023    BREAST RECONSTRUCTION  98/05    OTHER      Luci Santos - 2020  - Dr. Verduzco       Family  history   Family History   Problem Relation Age of Onset    Hypertension Mother     Arthritis Mother     Stroke Mother     Lung Disease Father         pulmonary fibrosis         Medications:   Outpatient Medications Marked as Taking for the 1/22/25 encounter (Office Visit) with Scotty Hemphill M.D.   Medication Sig Dispense Refill    tobramycin (TOBREX) 0.3 % Solution ophthalmic solution Administer 1 Drop into both eyes every 4 hours. 5 mL 0    acyclovir (ZOVIRAX) 400 MG tablet Take 1 Tablet by mouth 3 times a day. 90 Tablet 4    albuterol 108 (90 Base) MCG/ACT Aero Soln inhalation aerosol Inhale 2 Puffs every 6 hours as needed for Shortness of Breath. 8.5 g 5    fluticasone (FLONASE) 50 MCG/ACT nasal spray Administer 2 Sprays into affected nostril(S) at bedtime. 16 g 1    Omega-3 Fatty Acids (FISH OIL) 1000 MG Cap capsule Take 1,000 mg by mouth 3 times a day, with meals.      acyclovir (ZOVIRAX) 5 % Ointment APPLY TO AFFECTED AREA(S) THREE TIMES DAILY. 30 g 11    Cholecalciferol (VITAMIN D PO) Take  by mouth.      multivitamin (THERAGRAN) TABS Take 1 Tab by mouth every day.          Current Outpatient Medications on File Prior to Visit   Medication Sig Dispense Refill    tobramycin (TOBREX) 0.3 % Solution ophthalmic solution Administer 1 Drop into both eyes every 4 hours. 5 mL 0    acyclovir (ZOVIRAX) 400 MG tablet Take 1 Tablet by mouth 3 times a day. 90 Tablet 4    albuterol 108 (90 Base) MCG/ACT Aero Soln inhalation aerosol Inhale 2 Puffs every 6 hours as needed for Shortness of Breath. 8.5 g 5    fluticasone (FLONASE) 50 MCG/ACT nasal spray Administer 2 Sprays into affected nostril(S) at bedtime. 16 g 1    Omega-3 Fatty Acids (FISH OIL) 1000 MG Cap capsule Take 1,000 mg by mouth 3 times a day, with meals.      acyclovir (ZOVIRAX) 5 % Ointment APPLY TO AFFECTED AREA(S) THREE TIMES DAILY. 30 g 11    Cholecalciferol (VITAMIN D PO) Take  by mouth.      multivitamin (THERAGRAN) TABS Take 1 Tab by mouth every day.        No current facility-administered medications on file prior to visit.         Allergies:   Allergies   Allergen Reactions    Nkda [No Known Drug Allergy]        Social Hx:   Social History     Socioeconomic History    Marital status:      Spouse name: Not on file    Number of children: Not on file    Years of education: Not on file    Highest education level: Some college, no degree   Occupational History    Not on file   Tobacco Use    Smoking status: Former     Current packs/day: 0.00     Average packs/day: 0.5 packs/day for 10.0 years (5.0 ttl pk-yrs)     Types: Cigarettes     Start date: 1981     Quit date: 1991     Years since quittin.0    Smokeless tobacco: Never   Vaping Use    Vaping status: Never Used   Substance and Sexual Activity    Alcohol use: Yes     Comment: 2 per day    Drug use: No    Sexual activity: Not on file     Comment: , 1 child . working for the [employer]   Other Topics Concern    Not on file   Social History Narrative    Not on file     Social Drivers of Health     Financial Resource Strain: Low Risk  (2023)    Overall Financial Resource Strain (CARDIA)     Difficulty of Paying Living Expenses: Not very hard   Food Insecurity: No Food Insecurity (2023)    Hunger Vital Sign     Worried About Running Out of Food in the Last Year: Never true     Ran Out of Food in the Last Year: Never true   Transportation Needs: No Transportation Needs (2023)    PRAPARE - Transportation     Lack of Transportation (Medical): No     Lack of Transportation (Non-Medical): No   Physical Activity: Sufficiently Active (2023)    Exercise Vital Sign     Days of Exercise per Week: 6 days     Minutes of Exercise per Session: 60 min   Stress: Stress Concern Present (2023)    [place] [employer] - Occupational Stress Questionnaire     Feeling of Stress : To some extent   Social Connections: Socially Integrated (2023)    Social Connection and  "Isolation Panel [NHANES]     Frequency of Communication with Friends and Family: More than three times a week     Frequency of Social Gatherings with Friends and Family: Twice a week     Attends [employer] Services: 1 to 4 times per year     Active Member of Clubs or Organizations: Yes     Attends Club or Organization Meetings: More than 4 times per year     Marital Status:    Intimate Partner Violence: Not on file   Housing Stability: Low Risk  (8/6/2023)    Housing Stability Vital Sign     Unable to Pay for Housing in the Last Year: No     Number of Places Lived in the Last Year: 1     Unstable Housing in the Last Year: No         EXAMINATION     Physical Exam:   Vitals: /68 (BP Location: Left arm, Patient Position: Sitting, BP Cuff Size: Large adult)   Pulse 79   Temp 36.6 °C (97.9 °F) (Temporal)   Ht 1.702 m (5' 7\")   Wt 78.9 kg (174 lb)   SpO2 99%     Constitutional:   Body Habitus: Body mass index is 27.25 kg/m².  Cooperation: Fully cooperates with exam  Appearance: Well-groomed no disheveled    Respiratory-  breathing comfortable on room air, no audible wheezing  Cardiovascular- capillary refills less than 2 seconds. No lower extremity edema is noted.   Psychiatric- alert and oriented ×3. Normal affect.    MSK and Neuro: -    Physical Exam     Cervical spine    decreased active range of motion with flexion, lateral flexion, and rotation bilaterally.   There is decreased active range of motion with cervical extension.      Palpation:   Tenderness to palpation throughout the cervical spine: negative bilaterally        Cervical spine Special tests  Neuro tension  Spurling's maneuver positive right, negative left    Cervical facet loading maneuver  positive bilaterally        Key points for the international standards for neurological classification of spinal cord injury (ISNCSCI) to light touch.     Dermatome R L   C4 2 2   C5 2 2   C6 2 2   C7 1 2   C8 2 2   T1 2 2   T2 2 2                     " "                    Motor Exam Upper Extremities   ? Myotome R L   Shoulder flexion C5 5 5   Elbow flexion C5 5 5   Wrist extension C6 5 5   Elbow extension C7 5 5   Finger flexion C8 5 5   Finger abduction T1 5 5           MEDICAL DECISION MAKING    DATA    Labs:   Lab Results   Component Value Date/Time    SODIUM 141 04/30/2024 08:43 AM    POTASSIUM 4.9 04/30/2024 08:43 AM    CHLORIDE 106 04/30/2024 08:43 AM    CO2 23 04/30/2024 08:43 AM    GLUCOSE 92 04/30/2024 08:43 AM    BUN 16 04/30/2024 08:43 AM    CREATININE 0.76 04/30/2024 08:43 AM        No results found for: \"PROTHROMBTM\", \"INR\"     Lab Results   Component Value Date/Time    WBC 4.5 (L) 08/09/2024 02:19 PM    WBC 6.8 08/25/2010 02:04 PM    RBC 3.63 (L) 08/09/2024 02:19 PM    RBC 4.06 08/25/2010 02:04 PM    HEMOGLOBIN 12.4 08/09/2024 02:19 PM    HEMATOCRIT 37.0 08/09/2024 02:19 PM    .9 (H) 08/09/2024 02:19 PM     (H) 08/25/2010 02:04 PM    MCH 34.2 (H) 08/09/2024 02:19 PM    MCH 34.7 (H) 08/25/2010 02:04 PM    MCHC 33.5 08/09/2024 02:19 PM    MPV 9.4 08/09/2024 02:19 PM    NEUTSPOLYS 36.70 (L) 08/09/2024 02:19 PM    LYMPHOCYTES 45.70 (H) 08/09/2024 02:19 PM    MONOCYTES 11.00 08/09/2024 02:19 PM    EOSINOPHILS 4.90 08/09/2024 02:19 PM    BASOPHILS 1.50 08/09/2024 02:19 PM        Lab Results   Component Value Date/Time    HBA1C 5.4 04/30/2024 08:43 AM          Imaging:   I personally reviewed following images          Results  - Imaging (MRI of the cervical spine, 12/23/2024):    - No areas of high grade central canal stenosis    - Mild bilateral foraminal stenosis at C6-7    - Facet arthropathy bilaterally at C2-3, C3-4, C4-5, C5-6, C6-7       I reviewed the following radiology reports                     Results for orders placed during the hospital encounter of 12/23/24    MR-CERVICAL SPINE-W/O    Impression  Mild degenerative disease in the cervical spine as described above.                                                                "                                   Results for orders placed during the hospital encounter of 03/24/08    DX-ANKLE 3+ VIEWS    Impression  IMPRESSION:    1. NEGATIVE FOR FRACTURE.    2. MILD MIDFOOT OSTEOARTHRITIS.    3. PLANTAR CALCANEAL SPURRING.    (Preliminary Report faxed per Dr. Dukes to [phone] on 03/24/08 at 1215  hours /cl)    BDK:yonatan    Read By JACLYN DUKES MD on Mar 24 2008 11:52AM  : RAYMOND Transcription Date: Mar 24 2008 12:17PM  THIS DOCUMENT HAS BEEN ELECTRONICALLY SIGNED BY: JACLYN DUKES MD on Mar  24 2008  1:07PM   Results for orders placed during the hospital encounter of 08/10/23    DX-CERVICAL SPINE-2 OR 3 VIEWS    Impression  1.  No compression deformity or acute fracture is identified.    2.  Degenerative disc disease and facet arthropathy are noted throughout the cervical spine.     Results for orders placed in visit on 02/21/24    DX-CHEST-2 VIEWS    Impression  1.  No acute cardiopulmonary disease.  2.  Bilateral severe compression of mid to upper thoracic vertebral body, more apparent than on prior exam, age indeterminate.            Results for orders placed in visit on 09/08/15    DX-HAND 3+ LEFT    Impression  No radiographic evidence of acute traumatic injury.            Results for orders placed during the hospital encounter of 08/10/23    DX-LUMBAR SPINE-2 OR 3 VIEWS    Impression  1.  No compression deformity or acute fracture is identified.    2.  Degenerative disc disease and facet arthropathy is increased compared to the prior exam and most prominent in the lower lumbar spine.    Results for orders placed during the hospital encounter of 05/13/10    DX-PELVIS-1 OR 2 VIEWS           Results for orders placed during the hospital encounter of 10/24/09    DX-SHOULDER 2+    Impression  IMPRESSION:    NEGATIVE FOR RIGHT SHOULDER FRACTURE OR MALALIGNMENT.      BDK/lisethxm    Read By JACLYN DUKES MD on Oct 24 2009 12:02PM  : [email or web address] Transcription Date: Oct 24    1:41PM  THIS DOCUMENT HAS BEEN ELECTRONICALLY SIGNED BY: JACLYN YOUSSEF MD on Oct  24 2009  1:46PM       Results for orders placed in visit on 06/15/12    DX-WRIST-COMPLETE 3+    Impression  No acute fracture identified.  If symptoms are persistent, follow-up imaging would be recommended.         DIAGNOSIS   Visit Diagnoses     ICD-10-CM   1. Cervical radiculopathy  M54.12   2. Cervical spondylosis  M47.812   3. Chronic neck pain  M54.2    G89.29   4. Periscapular pain  M89.8X1         ASSESSMENT and PLAN:     Celia Shaffer  1960 female      Celia was seen today for follow-up.    Diagnoses and all orders for this visit:    Cervical radiculopathy  -     Referral to Physical Medicine Rehab    Cervical spondylosis    Chronic neck pain    Periscapular pain        Assessment & Plan  Chronic neck pain.  The MRI of the cervical spine from 2024 shows a small disc bulge at C6-7 without spinal cord impingement and minor foraminal stenosis at C6-7, which could be causing the numbness and pain in the neck. The pain is described as a dull, constant pain rated 6 out of 10 in intensity, located between the shoulder blades and slightly lower. There is also occasional numbness and tingling in the right arm, especially at night.     An epidural steroid injection at C7-T1 has been recommended as the initial treatment step to alleviate the pain.     The risks benefits and alternatives to this procedure were discussed and the patient wishes to proceed with the procedure. Risks include but are not limited to damage to surrounding structures, infection, bleeding, worsening of pain which can be permanent, weakness which can be permanent. Benefits include pain relief, improved function. Alternatives includes not doing the procedure.      Medications: Acetaminophen up to 1000 mg 3 times daily as needed not to exceed 3000 mg per 24-hours.    The patient will continue her current medication regimen as she is not  on any blood thinners. A thoracic MRI has been ordered previously by her PCP and should be scheduled at the patient's convenience.    Follow-up  The patient will follow up after the injection.          Thank you for allowing me to participate in the care of this patient. If you have any questions please not hesitate to contact me.             Please note that this dictation was created using voice recognition software. I have made every reasonable attempt to correct obvious errors but there may be errors of grammar and content that I may have overlooked prior to finalization of this note.      Scotty Hemphill MD  Interventional Spine and Sports Physiatry  Physical Medicine and Rehabilitation  [facility]

## 2025-01-22 NOTE — PROGRESS NOTES
Follow up patient note  Interventional spine and Pain  Physiatry (physical medicine and  Rehabilitation)       Chief complaint:   Chief Complaint   Patient presents with    Follow-Up     Neck pain          HISTORY    Please see new patient note by Dr Hemphill,  for more details.     HPI  Patient identification: Celia Shaffer ,  1960,   With Diagnoses of Cervical radiculopathy, Cervical spondylosis, Chronic neck pain, and Periscapular pain were pertinent to this visit.       Verbal consent was obtained for Benito copilot : Yes      History of Present Illness  The patient is a 64-year-old female presenting for a follow-up visit.    She reports chronic cervicalgia, with a pain intensity rated at 6 out of 10. Additionally, she describes persistent, dull thoracic pain localized to the mid-back region, specifically between the scapulae and slightly inferiorly. This bilateral pain also reaches an intensity of 6 out of 10 at its peak. Furthermore, the patient experiences nocturnal paresthesia and numbness in her right upper extremity.  The pain radiates down the bilateral arms right worse than left.  Chronic.    She has done a provider driven home exercise program including the past 6 months.  She has tried tramadol, acetaminophen she cannot tolerate NSAIDs or muscle relaxers.    Conservative treatments including the past two months within the past six months  NSAIDs: yes  Acetaminophen: yes  Home exercise program or physical therapy: yes  Activity modification: yes       ROS Red Flags :   Fever, Chills, Sweats: Denies  Involuntary Weight Loss: Denies  Bowel/Bladder Incontinence: Denies  Saddle Anesthesia: Denies        PMHx:   Past Medical History:   Diagnosis Date    Anesthesia     drop in b/p in pacu, trouble w/pain mgmt, ponv, father had hx of convulsions after surgery    ASTHMA     Breast cancer (HCC)     Right    Breath shortness     ongoing for past year    CAD (coronary artery disease)     previour  inferior wall infarct     Cancer (HCC) 1992    breast    CHEST PAIN 11/9/2012    Chronic neck pain     Cold     Depression     Dyslipidemia 7/13/2011    Hypothyroidism     Macrocytic anemia 8/22/2012    Myocardial infarct (HCC)     previous infarct on stress test     Pneumonia 2008    PUD (peptic ulcer disease)     Sleep apnea     sleep study done, has CPAP       PSHx:   Past Surgical History:   Procedure Laterality Date    TONSILLECTOMY  01/16/2012    Performed by LIMA PATEL at SURGERY SAME DAY [facility] ORS    TURBINATE REDUCTION  01/16/2012    Performed by LIMA PATEL at SURGERY SAME DAY [facility] ORS    UVULOPHARYNGOPALATOPLASTY  01/16/2012    Performed by LIMA PATEL at SURGERY SAME DAY [facility] ORS    SEPTOPLASTY  01/16/2012    Performed by LIMA PATEL at SURGERY SAME DAY [facility] ORS    BREAST RECONSTRUCTION  04/27/2011    Performed by LARA MICHEL at [facility]    LIPOSUCTION  04/27/2011    Performed by LARA MICHEL at [facility]    BREAST RECONSTRUCTION  10/21/2010    Performed by LARA MICHEL at [facility]    TISSUE EXPANDER PLACE/REMOVE  10/21/2010    Performed by LARA MICHEL at [facility]    BREAST IMPLANT REVISION  10/21/2010    Performed by LARA MICHEL at [facility]    BREAST RECONSTRUCTION  01/20/2010    Performed by LARA MICHEL at [facility]    BREAST IMPLANT REMOVAL  01/20/2010    Performed by LARA MICHEL at [facility] ORS    CAPSULECTOMY  01/20/2010    Performed by LARA MICHEL at [facility]    TISSUE EXPANDER PLACE/REMOVE  01/20/2010    Performed by LARA MICHEL at [facility]    OTHER  2007    uterine ablation    MASTECTOMY BILATERAL SUBQ  1991    BREAST IMPLANT REVISION Bilateral     Dr. Molina - 2/2023    BREAST RECONSTRUCTION  98/05    OTHER      Luci Santos - 2020  - Dr. Verduzco       Family  history   Family History   Problem Relation Age of Onset    Hypertension Mother     Arthritis Mother     Stroke Mother     Lung Disease Father         pulmonary fibrosis         Medications:   Outpatient Medications Marked as Taking for the 1/22/25 encounter (Office Visit) with Scotty Hemphill M.D.   Medication Sig Dispense Refill    tobramycin (TOBREX) 0.3 % Solution ophthalmic solution Administer 1 Drop into both eyes every 4 hours. 5 mL 0    acyclovir (ZOVIRAX) 400 MG tablet Take 1 Tablet by mouth 3 times a day. 90 Tablet 4    albuterol 108 (90 Base) MCG/ACT Aero Soln inhalation aerosol Inhale 2 Puffs every 6 hours as needed for Shortness of Breath. 8.5 g 5    fluticasone (FLONASE) 50 MCG/ACT nasal spray Administer 2 Sprays into affected nostril(S) at bedtime. 16 g 1    Omega-3 Fatty Acids (FISH OIL) 1000 MG Cap capsule Take 1,000 mg by mouth 3 times a day, with meals.      acyclovir (ZOVIRAX) 5 % Ointment APPLY TO AFFECTED AREA(S) THREE TIMES DAILY. 30 g 11    Cholecalciferol (VITAMIN D PO) Take  by mouth.      multivitamin (THERAGRAN) TABS Take 1 Tab by mouth every day.          Current Outpatient Medications on File Prior to Visit   Medication Sig Dispense Refill    tobramycin (TOBREX) 0.3 % Solution ophthalmic solution Administer 1 Drop into both eyes every 4 hours. 5 mL 0    acyclovir (ZOVIRAX) 400 MG tablet Take 1 Tablet by mouth 3 times a day. 90 Tablet 4    albuterol 108 (90 Base) MCG/ACT Aero Soln inhalation aerosol Inhale 2 Puffs every 6 hours as needed for Shortness of Breath. 8.5 g 5    fluticasone (FLONASE) 50 MCG/ACT nasal spray Administer 2 Sprays into affected nostril(S) at bedtime. 16 g 1    Omega-3 Fatty Acids (FISH OIL) 1000 MG Cap capsule Take 1,000 mg by mouth 3 times a day, with meals.      acyclovir (ZOVIRAX) 5 % Ointment APPLY TO AFFECTED AREA(S) THREE TIMES DAILY. 30 g 11    Cholecalciferol (VITAMIN D PO) Take  by mouth.      multivitamin (THERAGRAN) TABS Take 1 Tab by mouth every day.        No current facility-administered medications on file prior to visit.         Allergies:   Allergies   Allergen Reactions    Nkda [No Known Drug Allergy]        Social Hx:   Social History     Socioeconomic History    Marital status:      Spouse name: Not on file    Number of children: Not on file    Years of education: Not on file    Highest education level: Some college, no degree   Occupational History    Not on file   Tobacco Use    Smoking status: Former     Current packs/day: 0.00     Average packs/day: 0.5 packs/day for 10.0 years (5.0 ttl pk-yrs)     Types: Cigarettes     Start date: 1981     Quit date: 1991     Years since quittin.0    Smokeless tobacco: Never   Vaping Use    Vaping status: Never Used   Substance and Sexual Activity    Alcohol use: Yes     Comment: 2 per day    Drug use: No    Sexual activity: Not on file     Comment: , 1 child . working for the [employer] later [employer]   Other Topics Concern    Not on file   Social History Narrative    Not on file     Social Drivers of Health     Financial Resource Strain: Low Risk  (2023)    Overall Financial Resource Strain (CARDIA)     Difficulty of Paying Living Expenses: Not very hard   Food Insecurity: No Food Insecurity (2023)    Hunger Vital Sign     Worried About Running Out of Food in the Last Year: Never true     Ran Out of Food in the Last Year: Never true   Transportation Needs: No Transportation Needs (2023)    PRAPARE - Transportation     Lack of Transportation (Medical): No     Lack of Transportation (Non-Medical): No   Physical Activity: Sufficiently Active (2023)    Exercise Vital Sign     Days of Exercise per Week: 6 days     Minutes of Exercise per Session: 60 min   Stress: Stress Concern Present (2023)    [place] [employer] - Occupational Stress Questionnaire     Feeling of Stress : To some extent   Social Connections: Socially Integrated (2023)    Social Connection and  "Isolation Panel [NHANES]     Frequency of Communication with Friends and Family: More than three times a week     Frequency of Social Gatherings with Friends and Family: Twice a week     Attends Zoroastrianism Services: 1 to 4 times per year     Active Member of Clubs or Organizations: Yes     Attends Club or Organization Meetings: More than 4 times per year     Marital Status:    Intimate Partner Violence: Not on file   Housing Stability: Low Risk  (8/6/2023)    Housing Stability Vital Sign     Unable to Pay for Housing in the Last Year: No     Number of Places Lived in the Last Year: 1     Unstable Housing in the Last Year: No         EXAMINATION     Physical Exam:   Vitals: /68 (BP Location: Left arm, Patient Position: Sitting, BP Cuff Size: Large adult)   Pulse 79   Temp 36.6 °C (97.9 °F) (Temporal)   Ht 1.702 m (5' 7\")   Wt 78.9 kg (174 lb)   SpO2 99%     Constitutional:   Body Habitus: Body mass index is 27.25 kg/m².  Cooperation: Fully cooperates with exam  Appearance: Well-groomed no disheveled    Respiratory-  breathing comfortable on room air, no audible wheezing  Cardiovascular- capillary refills less than 2 seconds. No lower extremity edema is noted.   Psychiatric- alert and oriented ×3. Normal affect.    MSK and Neuro: -    Physical Exam     Cervical spine    decreased active range of motion with flexion, lateral flexion, and rotation bilaterally.   There is decreased active range of motion with cervical extension.      Palpation:   Tenderness to palpation throughout the cervical spine: negative bilaterally        Cervical spine Special tests  Neuro tension  Spurling's maneuver positive right, negative left    Cervical facet loading maneuver  positive bilaterally        Key points for the international standards for neurological classification of spinal cord injury (ISNCSCI) to light touch.     Dermatome R L   C4 2 2   C5 2 2   C6 2 2   C7 1 2   C8 2 2   T1 2 2   T2 2 2                     " "                    Motor Exam Upper Extremities   ? Myotome R L   Shoulder flexion C5 5 5   Elbow flexion C5 5 5   Wrist extension C6 5 5   Elbow extension C7 5 5   Finger flexion C8 5 5   Finger abduction T1 5 5           MEDICAL DECISION MAKING    DATA    Labs:   Lab Results   Component Value Date/Time    SODIUM 141 04/30/2024 08:43 AM    POTASSIUM 4.9 04/30/2024 08:43 AM    CHLORIDE 106 04/30/2024 08:43 AM    CO2 23 04/30/2024 08:43 AM    GLUCOSE 92 04/30/2024 08:43 AM    BUN 16 04/30/2024 08:43 AM    CREATININE 0.76 04/30/2024 08:43 AM        No results found for: \"PROTHROMBTM\", \"INR\"     Lab Results   Component Value Date/Time    WBC 4.5 (L) 08/09/2024 02:19 PM    WBC 6.8 08/25/2010 02:04 PM    RBC 3.63 (L) 08/09/2024 02:19 PM    RBC 4.06 08/25/2010 02:04 PM    HEMOGLOBIN 12.4 08/09/2024 02:19 PM    HEMATOCRIT 37.0 08/09/2024 02:19 PM    .9 (H) 08/09/2024 02:19 PM     (H) 08/25/2010 02:04 PM    MCH 34.2 (H) 08/09/2024 02:19 PM    MCH 34.7 (H) 08/25/2010 02:04 PM    MCHC 33.5 08/09/2024 02:19 PM    MPV 9.4 08/09/2024 02:19 PM    NEUTSPOLYS 36.70 (L) 08/09/2024 02:19 PM    LYMPHOCYTES 45.70 (H) 08/09/2024 02:19 PM    MONOCYTES 11.00 08/09/2024 02:19 PM    EOSINOPHILS 4.90 08/09/2024 02:19 PM    BASOPHILS 1.50 08/09/2024 02:19 PM        Lab Results   Component Value Date/Time    HBA1C 5.4 04/30/2024 08:43 AM          Imaging:   I personally reviewed following images          Results  - Imaging (MRI of the cervical spine, 12/23/2024):    - No areas of high grade central canal stenosis    - Mild bilateral foraminal stenosis at C6-7    - Facet arthropathy bilaterally at C2-3, C3-4, C4-5, C5-6, C6-7       I reviewed the following radiology reports                     Results for orders placed during the hospital encounter of 12/23/24    MR-CERVICAL SPINE-W/O    Impression  Mild degenerative disease in the cervical spine as described above.                                                                "                                   Results for orders placed during the hospital encounter of 03/24/08    DX-ANKLE 3+ VIEWS    Impression  IMPRESSION:    1. NEGATIVE FOR FRACTURE.    2. MILD MIDFOOT OSTEOARTHRITIS.    3. PLANTAR CALCANEAL SPURRING.    (Preliminary Report faxed per Dr. Dukes to [phone] on 03/24/08 at 1215  hours /cl)    BDK:yonatan    Read By JACLYN DUKES MD on Mar 24 2008 11:52AM  : RAYMOND Transcription Date: Mar 24 2008 12:17PM  THIS DOCUMENT HAS BEEN ELECTRONICALLY SIGNED BY: JACLYN DUKES MD on Mar  24 2008  1:07PM   Results for orders placed during the hospital encounter of 08/10/23    DX-CERVICAL SPINE-2 OR 3 VIEWS    Impression  1.  No compression deformity or acute fracture is identified.    2.  Degenerative disc disease and facet arthropathy are noted throughout the cervical spine.     Results for orders placed in visit on 02/21/24    DX-CHEST-2 VIEWS    Impression  1.  No acute cardiopulmonary disease.  2.  Bilateral severe compression of mid to upper thoracic vertebral body, more apparent than on prior exam, age indeterminate.            Results for orders placed in visit on 09/08/15    DX-HAND 3+ LEFT    Impression  No radiographic evidence of acute traumatic injury.            Results for orders placed during the hospital encounter of 08/10/23    DX-LUMBAR SPINE-2 OR 3 VIEWS    Impression  1.  No compression deformity or acute fracture is identified.    2.  Degenerative disc disease and facet arthropathy is increased compared to the prior exam and most prominent in the lower lumbar spine.    Results for orders placed during the hospital encounter of 05/13/10    DX-PELVIS-1 OR 2 VIEWS           Results for orders placed during the hospital encounter of 10/24/09    DX-SHOULDER 2+    Impression  IMPRESSION:    NEGATIVE FOR RIGHT SHOULDER FRACTURE OR MALALIGNMENT.      BDK/lisethxm    Read By JACLYN DUKES MD on Oct 24 2009 12:02PM  : KXM1 Transcription Date: Oct 24    1:41PM  THIS DOCUMENT HAS BEEN ELECTRONICALLY SIGNED BY: JACLYN YOUSSEF MD on Oct  24 2009  1:46PM       Results for orders placed in visit on 06/15/12    DX-WRIST-COMPLETE 3+    Impression  No acute fracture identified.  If symptoms are persistent, follow-up imaging would be recommended.         DIAGNOSIS   Visit Diagnoses     ICD-10-CM   1. Cervical radiculopathy  M54.12   2. Cervical spondylosis  M47.812   3. Chronic neck pain  M54.2    G89.29   4. Periscapular pain  M89.8X1         ASSESSMENT and PLAN:     Celia Shaffer  1960 female      Celia was seen today for follow-up.    Diagnoses and all orders for this visit:    Cervical radiculopathy  -     Referral to Physical Medicine Rehab    Cervical spondylosis    Chronic neck pain    Periscapular pain        Assessment & Plan  Chronic neck pain.  The MRI of the cervical spine from 2024 shows a small disc bulge at C6-7 without spinal cord impingement and minor foraminal stenosis at C6-7, which could be causing the numbness and pain in the neck. The pain is described as a dull, constant pain rated 6 out of 10 in intensity, located between the shoulder blades and slightly lower. There is also occasional numbness and tingling in the right arm, especially at night.     An epidural steroid injection at C7-T1 has been recommended as the initial treatment step to alleviate the pain.     The risks benefits and alternatives to this procedure were discussed and the patient wishes to proceed with the procedure. Risks include but are not limited to damage to surrounding structures, infection, bleeding, worsening of pain which can be permanent, weakness which can be permanent. Benefits include pain relief, improved function. Alternatives includes not doing the procedure.      Medications: Acetaminophen up to 1000 mg 3 times daily as needed not to exceed 3000 mg per 24-hours.    The patient will continue her current medication regimen as she is not  on any blood thinners. A thoracic MRI has been ordered previously by her PCP and should be scheduled at the patient's convenience.    Follow-up  The patient will follow up after the injection.          Thank you for allowing me to participate in the care of this patient. If you have any questions please not hesitate to contact me.             Please note that this dictation was created using voice recognition software. I have made every reasonable attempt to correct obvious errors but there may be errors of grammar and content that I may have overlooked prior to finalization of this note.      Scotty Hemphill MD  Interventional Spine and Sports Physiatry  Physical Medicine and Rehabilitation  [facility]

## 2025-02-05 ENCOUNTER — HOSPITAL ENCOUNTER (OUTPATIENT)
Facility: REHABILITATION | Age: 65
End: 2025-02-05
Attending: PHYSICAL MEDICINE & REHABILITATION | Admitting: PHYSICAL MEDICINE & REHABILITATION
Payer: COMMERCIAL

## 2025-02-05 ENCOUNTER — APPOINTMENT (OUTPATIENT)
Dept: RADIOLOGY | Facility: REHABILITATION | Age: 65
End: 2025-02-05
Attending: PHYSICAL MEDICINE & REHABILITATION
Payer: COMMERCIAL

## 2025-02-05 VITALS
HEART RATE: 60 BPM | WEIGHT: 167.99 LBS | HEIGHT: 67 IN | DIASTOLIC BLOOD PRESSURE: 55 MMHG | OXYGEN SATURATION: 95 % | TEMPERATURE: 97.7 F | SYSTOLIC BLOOD PRESSURE: 118 MMHG | RESPIRATION RATE: 16 BRPM | BODY MASS INDEX: 26.37 KG/M2

## 2025-02-05 PROCEDURE — 700117 HCHG RX CONTRAST REV CODE 255

## 2025-02-05 PROCEDURE — 64634 DESTROY C/TH FACET JNT ADDL: CPT

## 2025-02-05 PROCEDURE — 700111 HCHG RX REV CODE 636 W/ 250 OVERRIDE (IP): Mod: JZ

## 2025-02-05 PROCEDURE — 62321 NJX INTERLAMINAR CRV/THRC: CPT

## 2025-02-05 PROCEDURE — 99152 MOD SED SAME PHYS/QHP 5/>YRS: CPT

## 2025-02-05 RX ORDER — MIDAZOLAM HYDROCHLORIDE 1 MG/ML
INJECTION INTRAMUSCULAR; INTRAVENOUS
Status: COMPLETED
Start: 2025-02-05 | End: 2025-02-05

## 2025-02-05 RX ORDER — DEXAMETHASONE SODIUM PHOSPHATE 10 MG/ML
INJECTION, SOLUTION INTRAMUSCULAR; INTRAVENOUS
Status: DISCONTINUED
Start: 2025-02-05 | End: 2025-02-05 | Stop reason: HOSPADM

## 2025-02-05 RX ORDER — LIDOCAINE HYDROCHLORIDE 10 MG/ML
INJECTION, SOLUTION EPIDURAL; INFILTRATION; INTRACAUDAL; PERINEURAL
Status: COMPLETED
Start: 2025-02-05 | End: 2025-02-05

## 2025-02-05 RX ADMIN — IOHEXOL 5 ML: 240 INJECTION, SOLUTION INTRATHECAL; INTRAVASCULAR; INTRAVENOUS; ORAL at 10:16

## 2025-02-05 RX ADMIN — MIDAZOLAM HYDROCHLORIDE 0.5 MG: 1 INJECTION, SOLUTION INTRAMUSCULAR; INTRAVENOUS at 10:08

## 2025-02-05 RX ADMIN — FENTANYL CITRATE 25 MCG: 50 INJECTION, SOLUTION INTRAMUSCULAR; INTRAVENOUS at 10:08

## 2025-02-05 RX ADMIN — LIDOCAINE HYDROCHLORIDE 10 ML: 10 INJECTION, SOLUTION EPIDURAL; INFILTRATION; INTRACAUDAL; PERINEURAL at 10:16

## 2025-02-05 ASSESSMENT — FIBROSIS 4 INDEX: FIB4 SCORE: 1.37

## 2025-02-05 ASSESSMENT — PAIN DESCRIPTION - PAIN TYPE
TYPE: CHRONIC PAIN
TYPE: CHRONIC PAIN

## 2025-02-05 NOTE — OP REPORT
Date of Service: 2/5/2025    Physician/s: Scotty Hemphill MD    Pre-operative Diagnosis: Cervical radiculopathy    Post-operative Diagnosis: Cervical radiculopathy    Procedure: C7-T1  Cervical interlaminar epidural steroid injection with sedation    Description of procedure:    The risks, benefits, and alternatives of the procedure were reviewed and discussed with the patient.  Written informed consent was freely obtained. A pre-procedural time-out was conducted by the physician verifying patient’s identity, procedure to be performed, procedure site and side, and allergy verification. Appropriate equipment was determined to be in place for the procedure.     Moderation sedation was achieved with Versed (0.5mg) and Fentanyl (25mcg). Monitoring of the patients vital signs and respiratory status was provided by trained independent registered nurse during the entire course of the procedures and under my supervision and recoded in the patient’s medical record. The duration of sedation was over 10 minutes.      The patient's vital signs were carefully monitored before, throughout, and after the procedure.     In the fluoroscopy suite the patient was placed in a prone position, a pillow placed underneath their chest. The skin was prepped and draped in the usual sterile fashion.  The patient has bilateral pain but the right side is worse than the left.  The fluoroscope was placed over the cervical neck at the appropriate injection AP angle view, and the target for injection was marked. A 25g needle was placed into the marked site, and approx 2mL of 1% Lidocaine was injected subcutaneously into the epidermal and dermal layers. The needle was removed. A 20g Tuohy needle was then placed and advanced under fluoroscopic guidance into the  C7-T1 interlaminar space at both the initial position AP view and contralateral oblique at a lateral view to ensure proper location of the needle tip at all times.  The needle was advanced with  fluoroscopic guidance to the superior aspect of the T1 lamina.  Then a contralateral oblique view was used to advance the needle slightly towards the epidural space, A loss-of-resistance technique was used to guide the needle into the epidural space in a lateral fluoroscopic view and confirmed with loss of resistance with sterile normal saline. contrast dye was used to highlight the epidural space spread while the fluoroscope was running live. Following negative aspiration, 1mL of 10mg/mL of dexamethasone followed by 2 mL of sterile saline . The needle was removed intact after restyleted. The patient's back was covered with a 4x4 gauze, the area was cleansed with sterile normal saline, and a dressing was applied. There were no complications noted.     The patient was then evaluated post-procedure, and was hemodynamically stable prior to leaving the post-operative care unit.       The patient had 80 percent pain relief postprocedure  Preprocedure pain 5/10 NRS  Postprocedure pain 1 /10 NRS    Follow-up as scheduled    Scotty Hemphill MD  Interventional Spine and Pain  Physical Medicine and Rehabilitation  [facility]        CPT  interlaminar cervical/thoracic epidural: [id number]  moderate procedural sedation first 15 minutes: [id number]

## 2025-02-05 NOTE — INTERVAL H&P NOTE
Consented Procedure: cervical C7-T1 interlaminar epidural steroid injection  I have examined the patient, provided the risks, benefits, and alternatives to the procedure(s) indicated on the signed consent form, and the patient wishes to proceed.    H&P reviewed. The patient was examined and there are no changes to the H&P      Scotty Hemphill M.D.  02/05/25 9:33 AM

## 2025-02-05 NOTE — OR SURGEON
Immediate Post OP Note    Pre-Op Diagnosis Codes:      * Cervical radiculopathy [M54.12]      Post-Op Diagnosis Codes:     * Cervical radiculopathy [M54.12]      Procedure(s):  cervical C7-T1 interlaminar epidural steroid injection with sedation - Wound Class: Clean    Sedation 0.5 mg Versed and 25 mcg fentanyl      Surgeon(s):  Scotty Hemphill M.D.    Anesthesiologist/Type of Anesthesia:  No anesthesia staff entered./Local    Surgical Staff:  Circulator: Freya Headley R.N.  Scrub Person: Kary Oseguera R.N.  Radiology Technologist: Avel Le    Specimens removed if any:  * No specimens in log *    Estimated Blood Loss: None    Findings: None    Complications: None        2/5/2025 10:20 AM Scotty Hemphill M.D.

## 2025-02-06 ENCOUNTER — TELEPHONE (OUTPATIENT)
Dept: PHYSICAL MEDICINE AND REHAB | Facility: MEDICAL CENTER | Age: 65
End: 2025-02-06
Payer: COMMERCIAL

## 2025-02-06 NOTE — TELEPHONE ENCOUNTER
Called for post-sp check-up. Pt reported the following regarding the procedure site: cervical C7-T1 interlaminar epidural steroid injection     Change in pain?: Yes    Concerns?: No    Confirmed [facility] appt?: Yes

## 2025-03-24 ENCOUNTER — APPOINTMENT (OUTPATIENT)
Dept: PHYSICAL MEDICINE AND REHAB | Facility: MEDICAL CENTER | Age: 65
End: 2025-03-24
Payer: COMMERCIAL

## 2025-03-24 VITALS
HEART RATE: 75 BPM | DIASTOLIC BLOOD PRESSURE: 61 MMHG | HEIGHT: 67 IN | SYSTOLIC BLOOD PRESSURE: 103 MMHG | WEIGHT: 160 LBS | BODY MASS INDEX: 25.11 KG/M2 | OXYGEN SATURATION: 97 % | TEMPERATURE: 97 F

## 2025-03-24 DIAGNOSIS — M54.2 CHRONIC NECK PAIN: ICD-10-CM

## 2025-03-24 DIAGNOSIS — M54.6 CHRONIC BILATERAL THORACIC BACK PAIN: ICD-10-CM

## 2025-03-24 DIAGNOSIS — G89.29 CHRONIC BILATERAL THORACIC BACK PAIN: ICD-10-CM

## 2025-03-24 DIAGNOSIS — M54.12 CERVICAL RADICULOPATHY: ICD-10-CM

## 2025-03-24 DIAGNOSIS — R20.0 HAND NUMBNESS: ICD-10-CM

## 2025-03-24 DIAGNOSIS — G89.29 CHRONIC NECK PAIN: ICD-10-CM

## 2025-03-24 DIAGNOSIS — M89.8X1 PERISCAPULAR PAIN: ICD-10-CM

## 2025-03-24 DIAGNOSIS — M47.812 CERVICAL SPONDYLOSIS: ICD-10-CM

## 2025-03-24 PROCEDURE — 3078F DIAST BP <80 MM HG: CPT | Performed by: PHYSICAL MEDICINE & REHABILITATION

## 2025-03-24 PROCEDURE — 99214 OFFICE O/P EST MOD 30 MIN: CPT | Performed by: PHYSICAL MEDICINE & REHABILITATION

## 2025-03-24 PROCEDURE — 3074F SYST BP LT 130 MM HG: CPT | Performed by: PHYSICAL MEDICINE & REHABILITATION

## 2025-03-24 PROCEDURE — 1125F AMNT PAIN NOTED PAIN PRSNT: CPT | Performed by: PHYSICAL MEDICINE & REHABILITATION

## 2025-03-24 ASSESSMENT — FIBROSIS 4 INDEX: FIB4 SCORE: 1.37

## 2025-03-24 ASSESSMENT — PAIN SCALES - GENERAL: PAINLEVEL_OUTOF10: 1=MINIMAL PAIN

## 2025-03-24 ASSESSMENT — PATIENT HEALTH QUESTIONNAIRE - PHQ9
SUM OF ALL RESPONSES TO PHQ QUESTIONS 1-9: 8
CLINICAL INTERPRETATION OF PHQ2 SCORE: 2
5. POOR APPETITE OR OVEREATING: 1 - SEVERAL DAYS

## 2025-03-24 NOTE — PROGRESS NOTES
Follow up patient note  Interventional spine and Pain  Physiatry (physical medicine and  Rehabilitation)       Chief complaint:   Chief Complaint   Patient presents with    Follow-Up     Back pain          HISTORY    Please see new patient note by Dr Hemphill,  for more details.     HPI  Patient identification: Celia Shaffer ,  1960,   With Diagnoses of Cervical radiculopathy, Cervical spondylosis, Chronic neck pain, Periscapular pain, Hand numbness, and Chronic bilateral thoracic back pain were pertinent to this visit.       Verbal consent was obtained for Benito copilot : Yes      History of Present Illness  From the previous visit    The patient is a 64-year-old female presenting for a follow-up visit.    She reports chronic cervicalgia, with a pain intensity rated at 6 out of 10. Additionally, she describes persistent, dull thoracic pain localized to the mid-back region, specifically between the scapulae and slightly inferiorly. This bilateral pain also reaches an intensity of 6 out of 10 at its peak. Furthermore, the patient experiences nocturnal paresthesia and numbness in her right upper extremity.  The pain radiates down the bilateral arms right worse than left.  Chronic.    From this visit  The patient, a 64-year-old female, presents for a follow-up visit. She is post-procedural from 2025, having undergone a cervical C7-T1 interlaminar epidural steroid injection.    The patient reports a significant improvement in pain following the procedure. Her current pain intensity is rated at 1 out of 10 on the numeric rating scale, compared to pre-procedural pain levels of 5 out of 10, occasionally reaching 6 out of 10. This denotes a substantial reduction in pain. She describes a positive therapeutic response, with enhanced capability to perform daily activities and engage in physical exercises, including yoga stretches and playground activities.    She has done a provider driven home exercise  program including the past 6 months.  She has tried tramadol, acetaminophen she cannot tolerate NSAIDs or muscle relaxers.    Conservative treatments including the past two months within the past six months  NSAIDs: yes  Acetaminophen: yes  Home exercise program or physical therapy: yes  Activity modification: yes       ROS Red Flags :   Fever, Chills, Sweats: Denies  Involuntary Weight Loss: Denies  Bowel/Bladder Incontinence: Denies  Saddle Anesthesia: Denies        PMHx:   Past Medical History:   Diagnosis Date    Anesthesia     drop in b/p in pacu, trouble w/pain mgmt, ponv, father had hx of convulsions after surgery    ASTHMA     Breast cancer (HCC) 1992    Right    Breath shortness     ongoing for past year    CAD (coronary artery disease)     previour inferior wall infarct     Cancer (HCC) 1992    breast    CHEST PAIN 11/9/2012    Chronic neck pain     Cold     Depression     Dyslipidemia 7/13/2011    Hypothyroidism     Macrocytic anemia 8/22/2012    Myocardial infarct (HCC)     previous infarct on stress test     Pneumonia 2008    PUD (peptic ulcer disease)     Sleep apnea     sleep study done, has CPAP       PSHx:   Past Surgical History:   Procedure Laterality Date    NH INJ CERV/THORAC,W/ IMAGING  2/5/2025    Procedure: cervical C7-T1 interlaminar epidural steroid injection;  Surgeon: Scotty Hemphill M.D.;  [facility];  Service: Pain Management    TONSILLECTOMY  01/16/2012    Performed by LIMA PATEL at SURGERY SAME DAY [facility] ORS    TURBINATE REDUCTION  01/16/2012    Performed by LIMA PATEL at SURGERY SAME DAY [facility] ORS    UVULOPHARYNGOPALATOPLASTY  01/16/2012    Performed by LIMA PATEL at SURGERY SAME DAY [facility] ORS    SEPTOPLASTY  01/16/2012    Performed by LIMA PATEL at SURGERY SAME DAY [facility]    BREAST RECONSTRUCTION  04/27/2011    Performed by LARA MICHEL at SURGERY [facility] ORS    LIPOSUCTION  04/27/2011     Performed by LARA MICHEL at SURGERY [facility]    BREAST RECONSTRUCTION  10/21/2010    Performed by LARA MICHEL at [facility]    TISSUE EXPANDER PLACE/REMOVE  10/21/2010    Performed by LARA MICHEL at [facility]    BREAST IMPLANT REVISION  10/21/2010    Performed by LARA MICHEL at [facility]    BREAST RECONSTRUCTION  01/20/2010    Performed by LARA MICHEL at [facility]    BREAST IMPLANT REMOVAL  01/20/2010    Performed by LARA MICHEL at [facility]    CAPSULECTOMY  01/20/2010    Performed by LARA MICHEL at [facility]    TISSUE EXPANDER PLACE/REMOVE  01/20/2010    Performed by LARA MICHEL at [facility]    OTHER  2007    uterine ablation    MASTECTOMY BILATERAL SUBQ  1991    BREAST IMPLANT REVISION Bilateral     Dr. Molina - 2/2023    BREAST RECONSTRUCTION  98/05    OTHER      Luci Santos - 2020  - Dr. Verduzco       Family history   Family History   Problem Relation Age of Onset    Hypertension Mother     Arthritis Mother     Stroke Mother     Lung Disease Father         pulmonary fibrosis         Medications:   Outpatient Medications Marked as Taking for the 3/24/25 encounter (Office Visit) with Scotty Hemphill M.D.   Medication Sig Dispense Refill    tobramycin (TOBREX) 0.3 % Solution ophthalmic solution Administer 1 Drop into both eyes every 4 hours. 5 mL 0    acyclovir (ZOVIRAX) 400 MG tablet Take 1 Tablet by mouth 3 times a day. 90 Tablet 4    albuterol 108 (90 Base) MCG/ACT Aero Soln inhalation aerosol Inhale 2 Puffs every 6 hours as needed for Shortness of Breath. 8.5 g 5    fluticasone (FLONASE) 50 MCG/ACT nasal spray Administer 2 Sprays into affected nostril(S) at bedtime. 16 g 1    acyclovir (ZOVIRAX) 5 % Ointment APPLY TO AFFECTED AREA(S) THREE TIMES DAILY. 30 g 11    Cholecalciferol (VITAMIN D PO) Take  by mouth.      multivitamin (THERAGRAN) TABS Take 1  Tab by mouth every day.          Current Outpatient Medications on File Prior to Visit   Medication Sig Dispense Refill    tobramycin (TOBREX) 0.3 % Solution ophthalmic solution Administer 1 Drop into both eyes every 4 hours. 5 mL 0    acyclovir (ZOVIRAX) 400 MG tablet Take 1 Tablet by mouth 3 times a day. 90 Tablet 4    albuterol 108 (90 Base) MCG/ACT Aero Soln inhalation aerosol Inhale 2 Puffs every 6 hours as needed for Shortness of Breath. 8.5 g 5    fluticasone (FLONASE) 50 MCG/ACT nasal spray Administer 2 Sprays into affected nostril(S) at bedtime. 16 g 1    acyclovir (ZOVIRAX) 5 % Ointment APPLY TO AFFECTED AREA(S) THREE TIMES DAILY. 30 g 11    Cholecalciferol (VITAMIN D PO) Take  by mouth.      multivitamin (THERAGRAN) TABS Take 1 Tab by mouth every day.      Omega-3 Fatty Acids (FISH OIL) 1000 MG Cap capsule Take 1,000 mg by mouth 3 times a day, with meals.       No current facility-administered medications on file prior to visit.         Allergies:   Allergies   Allergen Reactions    Nkda [No Known Drug Allergy]        Social Hx:   Social History     Socioeconomic History    Marital status:      Spouse name: Not on file    Number of children: Not on file    Years of education: Not on file    Highest education level: Some college, no degree   Occupational History    Not on file   Tobacco Use    Smoking status: Former     Current packs/day: 0.00     Average packs/day: 0.5 packs/day for 10.0 years (5.0 ttl pk-yrs)     Types: Cigarettes     Start date: 1981     Quit date: 1991     Years since quittin.2    Smokeless tobacco: Never   Vaping Use    Vaping status: Never Used   Substance and Sexual Activity    Alcohol use: Yes     Comment: 2 per day    Drug use: No    Sexual activity: Not on file     Comment: , 1 child . working for the [employer]   Other Topics Concern    Not on file   Social History Narrative    Not on file     Social Drivers of Health     Financial Resource Strain: Low  "Risk  (8/6/2023)    Overall Financial Resource Strain (CARDIA)     Difficulty of Paying Living Expenses: Not very hard   Food Insecurity: No Food Insecurity (8/6/2023)    Hunger Vital Sign     Worried About Running Out of Food in the Last Year: Never true     Ran Out of Food in the Last Year: Never true   Transportation Needs: No Transportation Needs (8/6/2023)    PRAPARE - Transportation     Lack of Transportation (Medical): No     Lack of Transportation (Non-Medical): No   Physical Activity: Sufficiently Active (8/6/2023)    Exercise Vital Sign     Days of Exercise per Week: 6 days     Minutes of Exercise per Session: 60 min   Stress: Stress Concern Present (8/6/2023)    [place] [employer] Occupational [employer] - Occupational Stress Questionnaire     Feeling of Stress : To some extent   Social Connections: Socially Integrated (8/6/2023)    Social Connection and Isolation Panel [NHANES]     Frequency of Communication with Friends and Family: More than three times a week     Frequency of Social Gatherings with Friends and Family: Twice a week     Attends Cheondoism Services: 1 to 4 times per year     Active Member of Clubs or Organizations: Yes     Attends Club or Organization Meetings: More than 4 times per year     Marital Status:    Intimate Partner Violence: Not on file   Housing Stability: Low Risk  (8/6/2023)    Housing Stability Vital Sign     Unable to Pay for Housing in the Last Year: No     Number of Places Lived in the Last Year: 1     Unstable Housing in the Last Year: No         EXAMINATION     Physical Exam:   Vitals: /61 (BP Location: Right arm, Patient Position: Sitting, BP Cuff Size: Large adult)   Pulse 75   Temp 36.1 °C (97 °F) (Temporal)   Ht 1.702 m (5' 7\")   Wt 72.6 kg (160 lb)   SpO2 97%     Constitutional:   Body Habitus: Body mass index is 25.06 kg/m².  Cooperation: Fully cooperates with exam  Appearance: Well-groomed no disheveled    Respiratory-  breathing comfortable on " "room air, no audible wheezing  Cardiovascular- capillary refills less than 2 seconds. No lower extremity edema is noted.   Psychiatric- alert and oriented ×3. Normal affect.    MSK and Neuro: -    Physical Exam  The cervical spine shows near full range of motion. Spurling's maneuver is negative on both sides. Facet loading maneuver is also negative on both sides. Strength in the bilateral upper extremities is 5 out of 5. Sensation in the bilateral upper extremities is intact.        MEDICAL DECISION MAKING    DATA    Labs:   Lab Results   Component Value Date/Time    SODIUM 141 04/30/2024 08:43 AM    POTASSIUM 4.9 04/30/2024 08:43 AM    CHLORIDE 106 04/30/2024 08:43 AM    CO2 23 04/30/2024 08:43 AM    GLUCOSE 92 04/30/2024 08:43 AM    BUN 16 04/30/2024 08:43 AM    CREATININE 0.76 04/30/2024 08:43 AM        No results found for: \"PROTHROMBTM\", \"INR\"     Lab Results   Component Value Date/Time    WBC 4.5 (L) 08/09/2024 02:19 PM    WBC 6.8 08/25/2010 02:04 PM    RBC 3.63 (L) 08/09/2024 02:19 PM    RBC 4.06 08/25/2010 02:04 PM    HEMOGLOBIN 12.4 08/09/2024 02:19 PM    HEMATOCRIT 37.0 08/09/2024 02:19 PM    .9 (H) 08/09/2024 02:19 PM     (H) 08/25/2010 02:04 PM    MCH 34.2 (H) 08/09/2024 02:19 PM    MCH 34.7 (H) 08/25/2010 02:04 PM    MCHC 33.5 08/09/2024 02:19 PM    MPV 9.4 08/09/2024 02:19 PM    NEUTSPOLYS 36.70 (L) 08/09/2024 02:19 PM    LYMPHOCYTES 45.70 (H) 08/09/2024 02:19 PM    MONOCYTES 11.00 08/09/2024 02:19 PM    EOSINOPHILS 4.90 08/09/2024 02:19 PM    BASOPHILS 1.50 08/09/2024 02:19 PM        Lab Results   Component Value Date/Time    HBA1C 5.4 04/30/2024 08:43 AM          Imaging:   I personally reviewed following images          Results  - Imaging (MRI of the cervical spine, 12/23/2024):    - No areas of high grade central canal stenosis    - Mild bilateral foraminal stenosis at C6-7    - Facet arthropathy bilaterally at C2-3, C3-4, C4-5, C5-6, C6-7       I reviewed the following radiology " reports                     Results for orders placed during the hospital encounter of 12/23/24    MR-CERVICAL SPINE-W/O    Impression  Mild degenerative disease in the cervical spine as described above.                                                                                                  Results for orders placed during the hospital encounter of 03/24/08    DX-ANKLE 3+ VIEWS    Impression  IMPRESSION:    1. NEGATIVE FOR FRACTURE.    2. MILD MIDFOOT OSTEOARTHRITIS.    3. PLANTAR CALCANEAL SPURRING.    (Preliminary Report faxed per Dr. Dukes to [phone] on 03/24/08 at 1215  hours /[facility])    BDK:[facility]    Read By JACLYN DUKES MD on Mar 24 2008 11:52AM  : [facility] Transcription Date: Mar 24 2008 12:17PM  THIS DOCUMENT HAS BEEN ELECTRONICALLY SIGNED BY: JACLYN DUKES MD on Mar  24 2008  1:07PM   Results for orders placed during the hospital encounter of 08/10/23    DX-CERVICAL SPINE-2 OR 3 VIEWS    Impression  1.  No compression deformity or acute fracture is identified.    2.  Degenerative disc disease and facet arthropathy are noted throughout the cervical spine.     Results for orders placed in visit on 02/21/24    DX-CHEST-2 VIEWS    Impression  1.  No acute cardiopulmonary disease.  2.  Bilateral severe compression of mid to upper thoracic vertebral body, more apparent than on prior exam, age indeterminate.            Results for orders placed in visit on 09/08/15    DX-HAND 3+ LEFT    Impression  No radiographic evidence of acute traumatic injury.            Results for orders placed during the hospital encounter of 08/10/23    DX-LUMBAR SPINE-2 OR 3 VIEWS    Impression  1.  No compression deformity or acute fracture is identified.    2.  Degenerative disc disease and facet arthropathy is increased compared to the prior exam and most prominent in the lower lumbar spine.    Results for orders placed during the hospital encounter of 05/13/10    DX-PELVIS-1 OR 2 VIEWS           Results for orders  placed during the hospital encounter of 10/24/09    DX-SHOULDER 2+    Impression  IMPRESSION:    NEGATIVE FOR RIGHT SHOULDER FRACTURE OR MALALIGNMENT.      BDK/kxm    Read By JACLYN YOUSSEF MD on Oct 24 2009 12:02PM  : [facility] Transcription Date: Oct 24 2009  1:41PM  THIS DOCUMENT HAS BEEN ELECTRONICALLY SIGNED BY: JACLYN YOUSSEF MD on Oct  24 2009  1:46PM       Results for orders placed in visit on 06/15/12    DX-WRIST-COMPLETE 3+    Impression  No acute fracture identified.  If symptoms are persistent, follow-up imaging would be recommended.         DIAGNOSIS   Visit Diagnoses     ICD-10-CM   1. Cervical radiculopathy  M54.12   2. Cervical spondylosis  M47.812   3. Chronic neck pain  M54.2    G89.29   4. Periscapular pain  M89.8X1   5. Hand numbness  R20.0   6. Chronic bilateral thoracic back pain  M54.6    G89.29           ASSESSMENT and PLAN:     eClia Conley [facility]  1960 female      Celia was seen today for follow-up.    Diagnoses and all orders for this visit:    Cervical radiculopathy    Cervical spondylosis    Chronic neck pain    Periscapular pain    Hand numbness    Chronic bilateral thoracic back pain          Assessment & Plan  Improvement in pain after cervical C7-T1 interlaminar epidural steroid injection.  She reports significant improvement in pain, with current pain at 1 out of 10 in intensity compared to 5-6 out of 10 prior to the procedure. Physical examination reveals near full range of motion of the cervical spine, negative Spurling's and facet loading maneuvers bilaterally, and 5 out of 5 strength in the bilateral upper extremities. Sensation is intact in the bilateral upper extremities. She is advised to continue with her home exercises and stretches, and to maintain an active lifestyle. If she experiences a single day of severe pain, she should attempt to alleviate it through stretching. However, if the pain persists or worsens, she is instructed to contact the office  immediately.    Medications: Acetaminophen up to 1000 mg 3 times daily as needed not to exceed 3000 mg per 24-hours.    Follow-up  The patient will follow up in approximately 1 year for routine evaluation, or earlier if any complications arise.    PROCEDURE  The patient underwent a cervical C7-T1 interlaminar epidural steroid injection on 02/05/2025.          Thank you for allowing me to participate in the care of this patient. If you have any questions please not hesitate to contact me.             Please note that this dictation was created using voice recognition software. I have made every reasonable attempt to correct obvious errors but there may be errors of grammar and content that I may have overlooked prior to finalization of this note.      Scotty Hemphill MD  Interventional Spine and Sports Physiatry  Physical Medicine and Rehabilitation  [facility] of the [facility]

## 2025-04-18 DIAGNOSIS — G47.9 SLEEP DISTURBANCE: ICD-10-CM

## 2025-04-21 RX ORDER — ZOLPIDEM TARTRATE 5 MG/1
TABLET ORAL
Qty: 30 TABLET | Refills: 0 | Status: SHIPPED | OUTPATIENT
Start: 2025-04-21 | End: 2025-05-21

## 2025-05-15 ENCOUNTER — PATIENT MESSAGE (OUTPATIENT)
Dept: MEDICAL GROUP | Facility: LAB | Age: 65
End: 2025-05-15
Payer: COMMERCIAL

## 2025-05-15 RX ORDER — OSELTAMIVIR PHOSPHATE 75 MG/1
75 CAPSULE ORAL 2 TIMES DAILY
Qty: 10 CAPSULE | Refills: 0 | Status: SHIPPED | OUTPATIENT
Start: 2025-05-15

## 2025-08-20 ENCOUNTER — OFFICE VISIT (OUTPATIENT)
Dept: PHYSICAL MEDICINE AND REHAB | Facility: MEDICAL CENTER | Age: 65
End: 2025-08-20
Payer: COMMERCIAL

## 2025-08-20 VITALS
HEART RATE: 79 BPM | DIASTOLIC BLOOD PRESSURE: 57 MMHG | TEMPERATURE: 97.2 F | WEIGHT: 152.4 LBS | OXYGEN SATURATION: 97 % | BODY MASS INDEX: 23.92 KG/M2 | HEIGHT: 67 IN | SYSTOLIC BLOOD PRESSURE: 106 MMHG

## 2025-08-20 DIAGNOSIS — M54.2 CHRONIC NECK PAIN: ICD-10-CM

## 2025-08-20 DIAGNOSIS — M89.8X1 PERISCAPULAR PAIN: ICD-10-CM

## 2025-08-20 DIAGNOSIS — G89.29 CHRONIC NECK PAIN: ICD-10-CM

## 2025-08-20 DIAGNOSIS — M54.12 CERVICAL RADICULOPATHY: Primary | ICD-10-CM

## 2025-08-20 DIAGNOSIS — M47.812 CERVICAL SPONDYLOSIS: ICD-10-CM

## 2025-08-20 PROCEDURE — 3074F SYST BP LT 130 MM HG: CPT | Performed by: PHYSICAL MEDICINE & REHABILITATION

## 2025-08-20 PROCEDURE — 3078F DIAST BP <80 MM HG: CPT | Performed by: PHYSICAL MEDICINE & REHABILITATION

## 2025-08-20 PROCEDURE — 99214 OFFICE O/P EST MOD 30 MIN: CPT | Performed by: PHYSICAL MEDICINE & REHABILITATION

## 2025-08-20 PROCEDURE — 1125F AMNT PAIN NOTED PAIN PRSNT: CPT | Performed by: PHYSICAL MEDICINE & REHABILITATION

## 2025-08-20 ASSESSMENT — PATIENT HEALTH QUESTIONNAIRE - PHQ9
5. POOR APPETITE OR OVEREATING: 0 - NOT AT ALL
CLINICAL INTERPRETATION OF PHQ2 SCORE: 2
SUM OF ALL RESPONSES TO PHQ QUESTIONS 1-9: 9

## 2025-08-20 ASSESSMENT — PAIN SCALES - GENERAL: PAINLEVEL_OUTOF10: 8=MODERATE-SEVERE PAIN

## 2025-08-20 ASSESSMENT — FIBROSIS 4 INDEX: FIB4 SCORE: 1.4

## 2025-08-27 ENCOUNTER — APPOINTMENT (OUTPATIENT)
Dept: RADIOLOGY | Facility: REHABILITATION | Age: 65
End: 2025-08-27
Attending: PHYSICAL MEDICINE & REHABILITATION
Payer: COMMERCIAL

## 2025-08-27 ENCOUNTER — HOSPITAL ENCOUNTER (OUTPATIENT)
Facility: REHABILITATION | Age: 65
End: 2025-08-27
Attending: PHYSICAL MEDICINE & REHABILITATION | Admitting: PHYSICAL MEDICINE & REHABILITATION
Payer: COMMERCIAL

## 2025-08-27 VITALS
HEART RATE: 59 BPM | DIASTOLIC BLOOD PRESSURE: 66 MMHG | BODY MASS INDEX: 23.63 KG/M2 | OXYGEN SATURATION: 96 % | RESPIRATION RATE: 16 BRPM | WEIGHT: 150.57 LBS | HEIGHT: 67 IN | SYSTOLIC BLOOD PRESSURE: 109 MMHG | TEMPERATURE: 98.1 F

## 2025-08-27 PROCEDURE — 700117 HCHG RX CONTRAST REV CODE 255

## 2025-08-27 PROCEDURE — 62321 NJX INTERLAMINAR CRV/THRC: CPT

## 2025-08-27 PROCEDURE — 700101 HCHG RX REV CODE 250

## 2025-08-27 PROCEDURE — 700111 HCHG RX REV CODE 636 W/ 250 OVERRIDE (IP): Mod: JZ

## 2025-08-27 RX ORDER — SODIUM CHLORIDE 9 MG/ML
INJECTION, SOLUTION INTRAMUSCULAR; INTRAVENOUS; SUBCUTANEOUS
Status: COMPLETED
Start: 2025-08-27 | End: 2025-08-27

## 2025-08-27 RX ORDER — LIDOCAINE HYDROCHLORIDE 10 MG/ML
INJECTION, SOLUTION EPIDURAL; INFILTRATION; INTRACAUDAL; PERINEURAL
Status: COMPLETED
Start: 2025-08-27 | End: 2025-08-27

## 2025-08-27 RX ORDER — DEXAMETHASONE SODIUM PHOSPHATE 10 MG/ML
INJECTION, SOLUTION INTRAMUSCULAR; INTRAVENOUS
Status: COMPLETED
Start: 2025-08-27 | End: 2025-08-27

## 2025-08-27 RX ADMIN — IOHEXOL 5 ML: 240 INJECTION, SOLUTION INTRATHECAL; INTRAVASCULAR; INTRAVENOUS; ORAL at 10:57

## 2025-08-27 RX ADMIN — DEXAMETHASONE SODIUM PHOSPHATE 10 MG: 10 INJECTION, SOLUTION INTRAMUSCULAR; INTRAVENOUS at 10:57

## 2025-08-27 RX ADMIN — SODIUM CHLORIDE 10 ML: 9 INJECTION, SOLUTION INTRAMUSCULAR; INTRAVENOUS; SUBCUTANEOUS at 10:57

## 2025-08-27 RX ADMIN — LIDOCAINE HYDROCHLORIDE 10 ML: 10 INJECTION, SOLUTION EPIDURAL; INFILTRATION; INTRACAUDAL at 10:56

## 2025-08-27 ASSESSMENT — PAIN DESCRIPTION - PAIN TYPE
TYPE: CHRONIC PAIN
TYPE: CHRONIC PAIN

## 2025-08-27 ASSESSMENT — FIBROSIS 4 INDEX: FIB4 SCORE: 1.4

## 2025-08-28 ENCOUNTER — TELEPHONE (OUTPATIENT)
Dept: PHYSICAL MEDICINE AND REHAB | Facility: MEDICAL CENTER | Age: 65
End: 2025-08-28
Payer: COMMERCIAL

## 2025-09-04 ENCOUNTER — APPOINTMENT (OUTPATIENT)
Dept: MEDICAL GROUP | Facility: LAB | Age: 65
End: 2025-09-04
Payer: COMMERCIAL

## 2025-09-23 ENCOUNTER — APPOINTMENT (OUTPATIENT)
Dept: MEDICAL GROUP | Facility: LAB | Age: 65
End: 2025-09-23
Payer: COMMERCIAL

## 2025-09-24 ENCOUNTER — APPOINTMENT (OUTPATIENT)
Dept: MEDICAL GROUP | Facility: LAB | Age: 65
End: 2025-09-24
Payer: COMMERCIAL